# Patient Record
Sex: MALE | Race: OTHER
[De-identification: names, ages, dates, MRNs, and addresses within clinical notes are randomized per-mention and may not be internally consistent; named-entity substitution may affect disease eponyms.]

---

## 2017-03-14 ENCOUNTER — HOSPITAL ENCOUNTER (INPATIENT)
Dept: HOSPITAL 54 - ER | Age: 77
LOS: 6 days | Discharge: NURSING, REHAB OR LONG-TERM CARE | DRG: 885 | End: 2017-03-20
Attending: INTERNAL MEDICINE | Admitting: PSYCHIATRY & NEUROLOGY
Payer: MEDICARE

## 2017-03-14 VITALS — HEIGHT: 67 IN | WEIGHT: 192 LBS | BODY MASS INDEX: 30.13 KG/M2

## 2017-03-14 VITALS — SYSTOLIC BLOOD PRESSURE: 150 MMHG | DIASTOLIC BLOOD PRESSURE: 91 MMHG

## 2017-03-14 DIAGNOSIS — Z79.84: ICD-10-CM

## 2017-03-14 DIAGNOSIS — E11.22: ICD-10-CM

## 2017-03-14 DIAGNOSIS — N18.9: ICD-10-CM

## 2017-03-14 DIAGNOSIS — G40.909: ICD-10-CM

## 2017-03-14 DIAGNOSIS — F31.9: ICD-10-CM

## 2017-03-14 DIAGNOSIS — N17.9: ICD-10-CM

## 2017-03-14 DIAGNOSIS — F02.81: ICD-10-CM

## 2017-03-14 DIAGNOSIS — E78.5: ICD-10-CM

## 2017-03-14 DIAGNOSIS — E11.65: ICD-10-CM

## 2017-03-14 DIAGNOSIS — N40.0: ICD-10-CM

## 2017-03-14 DIAGNOSIS — F39: Primary | ICD-10-CM

## 2017-03-14 DIAGNOSIS — D63.8: ICD-10-CM

## 2017-03-14 DIAGNOSIS — I10: ICD-10-CM

## 2017-03-14 DIAGNOSIS — G30.9: ICD-10-CM

## 2017-03-14 DIAGNOSIS — I12.9: ICD-10-CM

## 2017-03-14 LAB
ALBUMIN SERPL BCP-MCNC: 3.4 G/DL (ref 3.4–5)
ALP SERPL-CCNC: 71 U/L (ref 46–116)
ALT SERPL W P-5'-P-CCNC: 17 U/L (ref 12–78)
APAP SERPL-MCNC: < 2 UG/ML (ref 10–30)
AST SERPL W P-5'-P-CCNC: 14 U/L (ref 15–37)
BACTERIA UR CULT: NO
BASOPHILS # BLD AUTO: 0 /CMM (ref 0–0.2)
BASOPHILS NFR BLD AUTO: 0.8 % (ref 0–2)
BILIRUB DIRECT SERPL-MCNC: 0.1 MG/DL (ref 0–0.2)
BILIRUB SERPL-MCNC: 0.3 MG/DL (ref 0.2–1)
BUN SERPL-MCNC: 30 MG/DL (ref 7–18)
CALCIUM SERPL-MCNC: 8.4 MG/DL (ref 8.5–10.1)
CANNABINOIDS UR QL SCN: NEGATIVE
CHLORIDE SERPL-SCNC: 108 MMOL/L (ref 98–107)
CO2 SERPL-SCNC: 28 MMOL/L (ref 21–32)
CREAT SERPL-MCNC: 1.2 MG/DL (ref 0.6–1.3)
EOSINOPHIL # BLD AUTO: 0 /CMM (ref 0–0.7)
EOSINOPHIL NFR BLD AUTO: 0.8 % (ref 0–6)
ETHANOL SERPL-MCNC: < 3 MG/DL (ref 0–0)
GLUCOSE SERPL-MCNC: 184 MG/DL (ref 74–106)
HCT VFR BLD AUTO: 33 % (ref 39–51)
HGB BLD-MCNC: 11.5 G/DL (ref 13.5–17.5)
LYMPHOCYTES NFR BLD AUTO: 1.2 /CMM (ref 0.8–4.8)
LYMPHOCYTES NFR BLD AUTO: 21.7 % (ref 20–44)
MCH RBC QN AUTO: 32 PG (ref 26–33)
MCHC RBC AUTO-ENTMCNC: 35 G/DL (ref 31–36)
MCV RBC AUTO: 91 FL (ref 80–96)
MONOCYTES NFR BLD AUTO: 0.3 /CMM (ref 0.1–1.3)
MONOCYTES NFR BLD AUTO: 5.3 % (ref 2–12)
NEUTROPHILS # BLD AUTO: 4 /CMM (ref 1.8–8.9)
NEUTROPHILS NFR BLD AUTO: 71.4 % (ref 43–81)
PCP UR QL SCN: NEGATIVE
PH UR STRIP: 5.5 [PH] (ref 5–8)
PLATELET # BLD AUTO: 109 /CMM (ref 150–450)
POTASSIUM SERPL-SCNC: 4.7 MMOL/L (ref 3.5–5.1)
PROT SERPL-MCNC: 6.5 G/DL (ref 6.4–8.2)
PROT UR QL STRIP: 30 MG/DL
RBC # BLD AUTO: 3.65 MIL/UL (ref 4.5–6)
RBC #/AREA URNS HPF: (no result) /HPF (ref 0–2)
RDW COEFFICIENT OF VARIATION: 12.9 (ref 11.5–15)
SALICYLATES SERPL-MCNC: < 2.8 MG/DL (ref 2.8–20)
SODIUM SERPL-SCNC: 143 MMOL/L (ref 136–145)
SP GR UR STRIP: 1.02 (ref 1–1.03)
UROBILINOGEN UR STRIP-MCNC: 1 EU/DL
WBC #/AREA URNS HPF: (no result) /HPF (ref 0–3)
WBC NRBC COR # BLD AUTO: 5.5 K/UL (ref 4.3–11)

## 2017-03-14 PROCEDURE — Z7610: HCPCS

## 2017-03-14 PROCEDURE — G0480 DRUG TEST DEF 1-7 CLASSES: HCPCS

## 2017-03-14 PROCEDURE — A4606 OXYGEN PROBE USED W OXIMETER: HCPCS

## 2017-03-14 NOTE — NUR
GPS ADMISSION NOTE, RECEIVED PATIENT FROM [facility]. PATIENT ARRIVED ON THIS UNIT 
AT 2300 VIA WHEELCHAIR WITH 1 CNA ESCORT.  PATIENT ADMITTED ON A 5150 HOLD FOR GD.  PER HOLD 
PATIENT IS CONFUSED, DISORGANIZED, DISORIENTATED, AND IS A POOR HISTORIAN.  ACCORDING TO 
STAFF AT [facility] PATIENT HAS BEEN EXHIBITING SEXUALLY INAPPROPRIATE BEHAVIOR 
TOWARD FEMALE STAFF AND RESIDENTS AT THE FACILITY.  PATIENT HAS A BEEN CHASING FEMALE 
RESIDENTS, TRYING TO HUG THEM, WHILE TRY TO TOUCH THEIR BREASTS.  WHEN PATIENT GETS 
REDIRECTED HE BECOMES AGGRESSIVE.  PATIENT IS UNABLE TO CARE FOR HIMSELF.  THE 5150 WAS 
REVIEWED AND THE DOCUMENTATION IN THE 5150 HOLD APPEARS TO REFLECT THE PRESENTATION OF THE 
PATIENT.  UPON FACE TO FACE ASSESSMENT PATIENT IS CURRENTLY LYING IN BED AWAKE, HAS NO S/S 
OR COMPLAINTS OF PAIN.  PATIENT IS DISPLAYING NO S/S OF APPARENT DISTRESS.  PATIENT 
BREATHING IS UNLABORED WITH EQUAL RISE AND FALL OF THE CHEST.  PATIENT IS ALERT AND 
ORIENTATED X 2 ON ROOM AIR AND IS Tongan SPEAKING ONLY.  PATIENT ASSISTED WITH TURING AND 
REPOSITIONING Q2HR AND PRN FOR COMFORT AND CIRCULATION.  PATIENT HAS NO NEEDS AT THIS TIME.  
PATIENT IS NOTED TO BEING ANXIOUS, DISHEVELED, DISORGANIZED, CONFUSED, COOPERATIVE, AND 
NEEDS REDIRECTION.  PATIENT DENIES SUICIDE IDEATIONS AND HOMICIDAL IDEATIONS AT THIS TIME.  
PATIENT IS UNDER THE PSYCHIATRIC CARE OF DR. GARCIA AND THE MEDICAL CARE OF DR COX.  PATIENT BELONGINGS WERE INVENTORIED AND CHECKED FOR CONTRABAND.  ALL 
CONTRABAND REMOVED AND STORED IN PATIENT HALLWAY LOCKER.  PATIENT ADVANCED DIRECTIVES 
PREFERENCE, IMMUNIZATIONS QUESTIONER, NECESSARY PAPERWORK, AND SKIN ASSESSMENT COMPLETED.  
PATIENT ORIENTATED TO ROOM, FLOOR, AND STAFF WITH ALL QUESTIONS ANSWERED.  PATIENT EDUCATED 
ON THE USE OF THE CALL BELL.  PATIENT BED SIDE RAILS ARE UP X 2 FOR SAFETY.  PATIENT BED IS 
LOCKED, LOW AND I WILL CONTINUE TO MONITOR THIS PATIENT Q 15 MIN WITH THE HELP OF STAFF TO 
MAINTAIN SAFETY.

## 2017-03-14 NOTE — NUR
PT BIB PA WITH A C/O INAPPROPRIATE SEXUAL BEHAVIOR TOWARDS STAFF/NURSES. PT IS 
East Timorese SPEAKING ONLY. URINE SAMPLE OBTAINED AND SENT TO LAB. PT IS IN ROOM #7. 
PT IS ON THE MONITOR AND CONTINUOUS PULSE OX.

## 2017-03-14 NOTE — NUR
IV STARTED IN LEFT HAND. PT REC'D MEDICATION AS ORDERED. EITAN LÓPEZ, ARRIVED 
AND IS REVIEWING PT'S CHART.

## 2017-03-15 VITALS — DIASTOLIC BLOOD PRESSURE: 80 MMHG | SYSTOLIC BLOOD PRESSURE: 150 MMHG

## 2017-03-15 VITALS — SYSTOLIC BLOOD PRESSURE: 135 MMHG | DIASTOLIC BLOOD PRESSURE: 77 MMHG

## 2017-03-15 VITALS — DIASTOLIC BLOOD PRESSURE: 77 MMHG | SYSTOLIC BLOOD PRESSURE: 135 MMHG

## 2017-03-15 LAB
CHOLEST SERPL-MCNC: 109 MG/DL (ref ?–200)
CREAT SERPL-MCNC: 0.9 MG/DL (ref 0.6–1.3)
HDLC SERPL-MCNC: 50 MG/DL (ref 40–60)
LDLC SERPL DIRECT ASSAY-MCNC: 44 MG/DL (ref 0–99)
TRIGL SERPL-MCNC: 84 MG/DL (ref 30–150)

## 2017-03-15 RX ADMIN — DIVALPROEX SODIUM SCH MG: 250 TABLET, DELAYED RELEASE ORAL at 18:12

## 2017-03-15 RX ADMIN — QUETIAPINE SCH MG: 25 TABLET, FILM COATED ORAL at 17:00

## 2017-03-15 RX ADMIN — DONEPEZIL HYDROCHLORIDE SCH MG: 5 TABLET ORAL at 21:20

## 2017-03-15 RX ADMIN — INSULIN HUMAN PRN UNIT: 100 INJECTION, SOLUTION PARENTERAL at 21:54

## 2017-03-15 RX ADMIN — Medication SCH EACH: at 21:46

## 2017-03-15 RX ADMIN — DIVALPROEX SODIUM SCH MG: 250 TABLET, DELAYED RELEASE ORAL at 14:01

## 2017-03-15 RX ADMIN — DIVALPROEX SODIUM SCH MG: 250 TABLET, DELAYED RELEASE ORAL at 10:00

## 2017-03-15 RX ADMIN — FINASTERIDE SCH MG: 5 TABLET, FILM COATED ORAL at 10:01

## 2017-03-15 RX ADMIN — METFORMIN HYDROCHLORIDE SCH MG: 500 TABLET, FILM COATED ORAL at 10:00

## 2017-03-15 RX ADMIN — ATORVASTATIN CALCIUM SCH MG: 40 TABLET, FILM COATED ORAL at 21:21

## 2017-03-15 RX ADMIN — Medication SCH EACH: at 07:30

## 2017-03-15 RX ADMIN — DOXAZOSIN MESYLATE SCH MG: 1 TABLET ORAL at 21:21

## 2017-03-15 RX ADMIN — ASPIRIN 81 MG SCH MG: 81 TABLET ORAL at 10:02

## 2017-03-15 RX ADMIN — Medication SCH EACH: at 12:00

## 2017-03-15 RX ADMIN — LORAZEPAM PRN MG: 0.5 TABLET ORAL at 12:57

## 2017-03-15 RX ADMIN — Medication SCH EACH: at 18:26

## 2017-03-15 RX ADMIN — METFORMIN HYDROCHLORIDE SCH MG: 500 TABLET, FILM COATED ORAL at 17:00

## 2017-03-15 RX ADMIN — Medication SCH TAB: at 10:00

## 2017-03-15 RX ADMIN — INSULIN HUMAN PRN UNIT: 100 INJECTION, SOLUTION PARENTERAL at 09:23

## 2017-03-15 RX ADMIN — LORAZEPAM PRN MG: 0.5 TABLET ORAL at 23:46

## 2017-03-15 RX ADMIN — INSULIN HUMAN PRN UNIT: 100 INJECTION, SOLUTION PARENTERAL at 18:34

## 2017-03-15 RX ADMIN — TEMAZEPAM PRN MG: 7.5 CAPSULE ORAL at 21:44

## 2017-03-15 NOTE — NUR
GPS RN NOTE, PATIENT NEEDS MEDICATION RECONCILIATION AND INSULIN SLIDING SCALE.  PAGED DR BROOKE JAVIER AND INFORMED HIM OF MY FINDINGS.  DR COX ORDERED A MILD INSULIN 
SLIDING SCALE AND TO CONTINUE ALL HOME MEDICATIONS.  ALL ORDERS NOTED AND CARRIED OUT.  WILL 
CONTINUE TO MONITOR THIS PATIENT.

## 2017-03-15 NOTE — NUR
Initial Discharge Plan: Patient resides at [address] [address]. [phone]. SW spoke with patient's wife [phone] who stated that 
she would like the patient to return to [facility].  confirmed with 
Felton [phone] from the facility who confirmed that patient can return upon discharge. 
ROSITA will follow-up with MD, family, and patient regarding most appropriate discharge. SW will 
help form a safe and proper discharge.



For smoking cessation, patient will be referred to the [employer] 

[facility] [phone] or [employer] 800-Lung-USA

## 2017-03-15 NOTE — NUR
PATIENT PREFERS DOOR CLOSED, EXPLAINED TO PATIENT NEED TO SEE INSIDE THE ROOM, PATIENT 
BECOMES AGITATED. PATIENT IN BED, WILL CLOSELY MONITOR FOR SAFETY.

## 2017-03-15 NOTE — NUR
PATIENT IN ACTIVITY ROOM, CALM, NO DISTRESS, DENIES ANY PAIN, WITH WIFE AND ANOTHER FAMILY 
MEMBER. Lithuanian SPEAKING ONLY. PATIENT HAS EPISODES OF RESTLESSNESS, CONFUSED, WANDERING AND 
RISK FOR ELOPEMENT. ABLE TO AMBULATE WITHOUT ASSISTANCE, STEADY GAIT. BEING MONITORED 
CLOSELY. MADE COMFORTABLE, WILL CONTINUE TO MONITOR.

## 2017-03-16 VITALS — SYSTOLIC BLOOD PRESSURE: 107 MMHG | DIASTOLIC BLOOD PRESSURE: 60 MMHG

## 2017-03-16 VITALS — DIASTOLIC BLOOD PRESSURE: 81 MMHG | SYSTOLIC BLOOD PRESSURE: 150 MMHG

## 2017-03-16 VITALS — DIASTOLIC BLOOD PRESSURE: 62 MMHG | SYSTOLIC BLOOD PRESSURE: 100 MMHG

## 2017-03-16 LAB
BUN SERPL-MCNC: 20 MG/DL (ref 7–18)
CALCIUM SERPL-MCNC: 8.6 MG/DL (ref 8.5–10.1)
CHLORIDE SERPL-SCNC: 106 MMOL/L (ref 98–107)
CO2 SERPL-SCNC: 28 MMOL/L (ref 21–32)
CREAT SERPL-MCNC: 0.9 MG/DL (ref 0.6–1.3)
GLUCOSE SERPL-MCNC: 172 MG/DL (ref 74–106)
MAGNESIUM SERPL-MCNC: 1.4 MG/DL (ref 1.8–2.4)
PHOSPHATE SERPL-MCNC: 2.8 MG/DL (ref 2.5–4.9)
POTASSIUM SERPL-SCNC: 4 MMOL/L (ref 3.5–5.1)
SODIUM SERPL-SCNC: 142 MMOL/L (ref 136–145)

## 2017-03-16 RX ADMIN — Medication SCH EACH: at 11:55

## 2017-03-16 RX ADMIN — MAGNESIUM OXIDE TAB 400 MG (241.3 MG ELEMENTAL MG) SCH MG: 400 (241.3 MG) TAB at 11:55

## 2017-03-16 RX ADMIN — DIVALPROEX SODIUM SCH MG: 250 TABLET, DELAYED RELEASE ORAL at 17:13

## 2017-03-16 RX ADMIN — QUETIAPINE SCH MG: 25 TABLET, FILM COATED ORAL at 08:14

## 2017-03-16 RX ADMIN — Medication SCH EACH: at 06:26

## 2017-03-16 RX ADMIN — ATORVASTATIN CALCIUM SCH MG: 40 TABLET, FILM COATED ORAL at 21:49

## 2017-03-16 RX ADMIN — Medication SCH EACH: at 17:15

## 2017-03-16 RX ADMIN — INSULIN HUMAN PRN UNIT: 100 INJECTION, SOLUTION PARENTERAL at 06:34

## 2017-03-16 RX ADMIN — DIVALPROEX SODIUM SCH MG: 250 TABLET, DELAYED RELEASE ORAL at 12:10

## 2017-03-16 RX ADMIN — BENAZEPRIL HYDROCHLORIDE SCH MG: 5 TABLET, COATED ORAL at 08:14

## 2017-03-16 RX ADMIN — TEMAZEPAM PRN MG: 7.5 CAPSULE ORAL at 21:49

## 2017-03-16 RX ADMIN — QUETIAPINE SCH MG: 25 TABLET, FILM COATED ORAL at 17:13

## 2017-03-16 RX ADMIN — METFORMIN HYDROCHLORIDE SCH MG: 500 TABLET, FILM COATED ORAL at 08:14

## 2017-03-16 RX ADMIN — DIVALPROEX SODIUM SCH MG: 250 TABLET, DELAYED RELEASE ORAL at 08:15

## 2017-03-16 RX ADMIN — Medication SCH TAB: at 08:18

## 2017-03-16 RX ADMIN — DONEPEZIL HYDROCHLORIDE SCH MG: 5 TABLET ORAL at 21:49

## 2017-03-16 RX ADMIN — FINASTERIDE SCH MG: 5 TABLET, FILM COATED ORAL at 08:18

## 2017-03-16 RX ADMIN — Medication SCH EACH: at 21:48

## 2017-03-16 RX ADMIN — METFORMIN HYDROCHLORIDE SCH MG: 500 TABLET, FILM COATED ORAL at 17:13

## 2017-03-16 RX ADMIN — DOXAZOSIN MESYLATE SCH MG: 1 TABLET ORAL at 21:49

## 2017-03-16 RX ADMIN — QUETIAPINE SCH MG: 25 TABLET, FILM COATED ORAL at 12:10

## 2017-03-16 RX ADMIN — ASPIRIN 81 MG SCH MG: 81 TABLET ORAL at 08:18

## 2017-03-16 RX ADMIN — Medication SCH EACH: at 17:18

## 2017-03-16 NOTE — NUR
PATIENT IN BED, AWAKE AND ALERT, CALM, NO INAPPROPRIATE BEHAVIOR TOWARDS STAFF, COMPLIANT 
WITH MEDICATION AND ACCUCHECK. SLEPT INTERMITTENTLY FOR 6 HOURS. , GIVEN INSULIN 2 
UNITS. FALL RISK, PATIENT WANDERING AND AN AWOL RISK. SITTER WILL BE PROVIDED DURING AM 
SHIFT.

## 2017-03-17 VITALS — DIASTOLIC BLOOD PRESSURE: 76 MMHG | SYSTOLIC BLOOD PRESSURE: 125 MMHG

## 2017-03-17 VITALS — DIASTOLIC BLOOD PRESSURE: 61 MMHG | SYSTOLIC BLOOD PRESSURE: 111 MMHG

## 2017-03-17 VITALS — DIASTOLIC BLOOD PRESSURE: 77 MMHG | SYSTOLIC BLOOD PRESSURE: 128 MMHG

## 2017-03-17 RX ADMIN — FINASTERIDE SCH MG: 5 TABLET, FILM COATED ORAL at 08:23

## 2017-03-17 RX ADMIN — DIVALPROEX SODIUM SCH MG: 250 TABLET, DELAYED RELEASE ORAL at 17:05

## 2017-03-17 RX ADMIN — QUETIAPINE SCH MG: 25 TABLET, FILM COATED ORAL at 21:46

## 2017-03-17 RX ADMIN — DIVALPROEX SODIUM SCH MG: 250 TABLET, DELAYED RELEASE ORAL at 08:23

## 2017-03-17 RX ADMIN — METFORMIN HYDROCHLORIDE SCH MG: 500 TABLET, FILM COATED ORAL at 17:05

## 2017-03-17 RX ADMIN — LORAZEPAM PRN MG: 0.5 TABLET ORAL at 23:07

## 2017-03-17 RX ADMIN — ASPIRIN 81 MG SCH MG: 81 TABLET ORAL at 08:23

## 2017-03-17 RX ADMIN — DIVALPROEX SODIUM SCH MG: 250 TABLET, DELAYED RELEASE ORAL at 12:53

## 2017-03-17 RX ADMIN — INSULIN HUMAN PRN UNIT: 100 INJECTION, SOLUTION PARENTERAL at 12:16

## 2017-03-17 RX ADMIN — INSULIN HUMAN PRN UNIT: 100 INJECTION, SOLUTION PARENTERAL at 17:13

## 2017-03-17 RX ADMIN — Medication SCH EACH: at 17:11

## 2017-03-17 RX ADMIN — QUETIAPINE SCH MG: 25 TABLET, FILM COATED ORAL at 08:24

## 2017-03-17 RX ADMIN — DIVALPROEX SODIUM SCH MG: 250 TABLET, DELAYED RELEASE ORAL at 21:45

## 2017-03-17 RX ADMIN — Medication SCH EACH: at 07:47

## 2017-03-17 RX ADMIN — DOXAZOSIN MESYLATE SCH MG: 1 TABLET ORAL at 21:45

## 2017-03-17 RX ADMIN — Medication SCH EACH: at 21:54

## 2017-03-17 RX ADMIN — DONEPEZIL HYDROCHLORIDE SCH MG: 5 TABLET ORAL at 23:03

## 2017-03-17 RX ADMIN — QUETIAPINE SCH MG: 25 TABLET, FILM COATED ORAL at 17:05

## 2017-03-17 RX ADMIN — Medication SCH TAB: at 08:23

## 2017-03-17 RX ADMIN — MAGNESIUM OXIDE TAB 400 MG (241.3 MG ELEMENTAL MG) SCH MG: 400 (241.3 MG) TAB at 08:23

## 2017-03-17 RX ADMIN — BENAZEPRIL HYDROCHLORIDE SCH MG: 5 TABLET, COATED ORAL at 08:23

## 2017-03-17 RX ADMIN — Medication SCH EACH: at 12:15

## 2017-03-17 RX ADMIN — INSULIN HUMAN PRN UNIT: 100 INJECTION, SOLUTION PARENTERAL at 21:56

## 2017-03-17 RX ADMIN — ATORVASTATIN CALCIUM SCH MG: 40 TABLET, FILM COATED ORAL at 23:04

## 2017-03-17 RX ADMIN — METFORMIN HYDROCHLORIDE SCH MG: 500 TABLET, FILM COATED ORAL at 08:24

## 2017-03-17 RX ADMIN — QUETIAPINE SCH MG: 25 TABLET, FILM COATED ORAL at 12:53

## 2017-03-18 VITALS — SYSTOLIC BLOOD PRESSURE: 124 MMHG | DIASTOLIC BLOOD PRESSURE: 81 MMHG

## 2017-03-18 VITALS — DIASTOLIC BLOOD PRESSURE: 78 MMHG | SYSTOLIC BLOOD PRESSURE: 128 MMHG

## 2017-03-18 VITALS — DIASTOLIC BLOOD PRESSURE: 95 MMHG | SYSTOLIC BLOOD PRESSURE: 154 MMHG

## 2017-03-18 RX ADMIN — QUETIAPINE SCH MG: 25 TABLET, FILM COATED ORAL at 12:10

## 2017-03-18 RX ADMIN — DIVALPROEX SODIUM SCH MG: 250 TABLET, DELAYED RELEASE ORAL at 21:22

## 2017-03-18 RX ADMIN — METFORMIN HYDROCHLORIDE SCH MG: 500 TABLET, FILM COATED ORAL at 08:31

## 2017-03-18 RX ADMIN — MAGNESIUM OXIDE TAB 400 MG (241.3 MG ELEMENTAL MG) SCH MG: 400 (241.3 MG) TAB at 08:31

## 2017-03-18 RX ADMIN — METFORMIN HYDROCHLORIDE SCH MG: 500 TABLET, FILM COATED ORAL at 16:48

## 2017-03-18 RX ADMIN — LORAZEPAM PRN MG: 0.5 TABLET ORAL at 11:46

## 2017-03-18 RX ADMIN — Medication SCH EACH: at 22:25

## 2017-03-18 RX ADMIN — INSULIN HUMAN PRN UNIT: 100 INJECTION, SOLUTION PARENTERAL at 11:57

## 2017-03-18 RX ADMIN — ASPIRIN 81 MG SCH MG: 81 TABLET ORAL at 08:31

## 2017-03-18 RX ADMIN — QUETIAPINE SCH MG: 25 TABLET, FILM COATED ORAL at 16:48

## 2017-03-18 RX ADMIN — Medication SCH EACH: at 07:45

## 2017-03-18 RX ADMIN — BENAZEPRIL HYDROCHLORIDE SCH MG: 5 TABLET, COATED ORAL at 08:31

## 2017-03-18 RX ADMIN — ATORVASTATIN CALCIUM SCH MG: 40 TABLET, FILM COATED ORAL at 22:26

## 2017-03-18 RX ADMIN — Medication SCH EACH: at 16:52

## 2017-03-18 RX ADMIN — DOXAZOSIN MESYLATE SCH MG: 1 TABLET ORAL at 22:26

## 2017-03-18 RX ADMIN — DIVALPROEX SODIUM SCH MG: 250 TABLET, DELAYED RELEASE ORAL at 08:34

## 2017-03-18 RX ADMIN — DONEPEZIL HYDROCHLORIDE SCH MG: 5 TABLET ORAL at 22:25

## 2017-03-18 RX ADMIN — DIVALPROEX SODIUM SCH MG: 250 TABLET, DELAYED RELEASE ORAL at 12:10

## 2017-03-18 RX ADMIN — DIVALPROEX SODIUM SCH MG: 250 TABLET, DELAYED RELEASE ORAL at 16:48

## 2017-03-18 RX ADMIN — Medication SCH TAB: at 08:31

## 2017-03-18 RX ADMIN — Medication SCH EACH: at 12:10

## 2017-03-18 RX ADMIN — TEMAZEPAM PRN MG: 7.5 CAPSULE ORAL at 22:26

## 2017-03-18 RX ADMIN — QUETIAPINE SCH MG: 25 TABLET, FILM COATED ORAL at 21:22

## 2017-03-18 RX ADMIN — FINASTERIDE SCH MG: 5 TABLET, FILM COATED ORAL at 08:31

## 2017-03-18 RX ADMIN — QUETIAPINE SCH MG: 25 TABLET, FILM COATED ORAL at 08:34

## 2017-03-18 NOTE — NUR
GPS RN: PATIENT IS RESTLESS, EASILY AGITATED, UNCOOPERATIVE, HARD TO REDIRECT. ADMINISTERED 
ATIVAN 1MG PO AS ORDERED. CONTINUE 1:1 MONITORING.

## 2017-03-19 VITALS — SYSTOLIC BLOOD PRESSURE: 107 MMHG | DIASTOLIC BLOOD PRESSURE: 57 MMHG

## 2017-03-19 VITALS — DIASTOLIC BLOOD PRESSURE: 72 MMHG | SYSTOLIC BLOOD PRESSURE: 129 MMHG

## 2017-03-19 VITALS — DIASTOLIC BLOOD PRESSURE: 80 MMHG | SYSTOLIC BLOOD PRESSURE: 130 MMHG

## 2017-03-19 RX ADMIN — LORAZEPAM PRN MG: 0.5 TABLET ORAL at 10:53

## 2017-03-19 RX ADMIN — FINASTERIDE SCH MG: 5 TABLET, FILM COATED ORAL at 08:29

## 2017-03-19 RX ADMIN — LORAZEPAM PRN MG: 0.5 TABLET ORAL at 00:55

## 2017-03-19 RX ADMIN — DOXAZOSIN MESYLATE SCH MG: 1 TABLET ORAL at 21:34

## 2017-03-19 RX ADMIN — Medication SCH TAB: at 08:29

## 2017-03-19 RX ADMIN — METFORMIN HYDROCHLORIDE SCH MG: 500 TABLET, FILM COATED ORAL at 08:29

## 2017-03-19 RX ADMIN — DONEPEZIL HYDROCHLORIDE SCH MG: 5 TABLET ORAL at 21:33

## 2017-03-19 RX ADMIN — METFORMIN HYDROCHLORIDE SCH MG: 500 TABLET, FILM COATED ORAL at 17:39

## 2017-03-19 RX ADMIN — QUETIAPINE SCH MG: 25 TABLET, FILM COATED ORAL at 17:39

## 2017-03-19 RX ADMIN — DIVALPROEX SODIUM SCH MG: 250 TABLET, DELAYED RELEASE ORAL at 13:19

## 2017-03-19 RX ADMIN — Medication SCH EACH: at 17:30

## 2017-03-19 RX ADMIN — QUETIAPINE SCH MG: 25 TABLET, FILM COATED ORAL at 21:33

## 2017-03-19 RX ADMIN — BENAZEPRIL HYDROCHLORIDE SCH MG: 5 TABLET, COATED ORAL at 08:29

## 2017-03-19 RX ADMIN — Medication SCH EACH: at 12:00

## 2017-03-19 RX ADMIN — QUETIAPINE SCH MG: 25 TABLET, FILM COATED ORAL at 08:31

## 2017-03-19 RX ADMIN — ASPIRIN 81 MG SCH MG: 81 TABLET ORAL at 08:29

## 2017-03-19 RX ADMIN — MAGNESIUM OXIDE TAB 400 MG (241.3 MG ELEMENTAL MG) SCH MG: 400 (241.3 MG) TAB at 08:29

## 2017-03-19 RX ADMIN — DIVALPROEX SODIUM SCH MG: 250 TABLET, DELAYED RELEASE ORAL at 21:33

## 2017-03-19 RX ADMIN — QUETIAPINE SCH MG: 25 TABLET, FILM COATED ORAL at 13:19

## 2017-03-19 RX ADMIN — INSULIN HUMAN PRN UNIT: 100 INJECTION, SOLUTION PARENTERAL at 12:02

## 2017-03-19 RX ADMIN — DIVALPROEX SODIUM SCH MG: 250 TABLET, DELAYED RELEASE ORAL at 08:32

## 2017-03-19 RX ADMIN — LORAZEPAM PRN MG: 0.5 TABLET ORAL at 17:39

## 2017-03-19 RX ADMIN — DIVALPROEX SODIUM SCH MG: 250 TABLET, DELAYED RELEASE ORAL at 17:39

## 2017-03-19 RX ADMIN — ATORVASTATIN CALCIUM SCH MG: 40 TABLET, FILM COATED ORAL at 21:33

## 2017-03-19 RX ADMIN — Medication SCH EACH: at 21:34

## 2017-03-19 RX ADMIN — Medication SCH EACH: at 08:15

## 2017-03-19 NOTE — NUR
GPS RN:

 PATIENT IS RESTLESS, EASILY AGITATED,  HARD TO REDIRECT. ADMINISTERED ATIVAN 1MG PO AS 
ORDERED. CONTINUE 1:1 MONITORING.

## 2017-03-20 VITALS — SYSTOLIC BLOOD PRESSURE: 104 MMHG | DIASTOLIC BLOOD PRESSURE: 83 MMHG

## 2017-03-20 VITALS — DIASTOLIC BLOOD PRESSURE: 83 MMHG | SYSTOLIC BLOOD PRESSURE: 104 MMHG

## 2017-03-20 RX ADMIN — METFORMIN HYDROCHLORIDE SCH MG: 500 TABLET, FILM COATED ORAL at 08:30

## 2017-03-20 RX ADMIN — INSULIN HUMAN PRN UNIT: 100 INJECTION, SOLUTION PARENTERAL at 12:42

## 2017-03-20 RX ADMIN — LORAZEPAM PRN MG: 0.5 TABLET ORAL at 11:26

## 2017-03-20 RX ADMIN — Medication SCH EACH: at 08:30

## 2017-03-20 RX ADMIN — QUETIAPINE SCH MG: 25 TABLET, FILM COATED ORAL at 12:40

## 2017-03-20 RX ADMIN — QUETIAPINE SCH MG: 25 TABLET, FILM COATED ORAL at 08:35

## 2017-03-20 RX ADMIN — BENAZEPRIL HYDROCHLORIDE SCH MG: 5 TABLET, COATED ORAL at 08:35

## 2017-03-20 RX ADMIN — Medication SCH TAB: at 08:30

## 2017-03-20 RX ADMIN — INSULIN HUMAN PRN UNIT: 100 INJECTION, SOLUTION PARENTERAL at 08:43

## 2017-03-20 RX ADMIN — ASPIRIN 81 MG SCH MG: 81 TABLET ORAL at 08:30

## 2017-03-20 RX ADMIN — Medication SCH EACH: at 11:26

## 2017-03-20 RX ADMIN — DIVALPROEX SODIUM SCH MG: 250 TABLET, DELAYED RELEASE ORAL at 12:40

## 2017-03-20 RX ADMIN — FINASTERIDE SCH MG: 5 TABLET, FILM COATED ORAL at 08:30

## 2017-03-20 RX ADMIN — DIVALPROEX SODIUM SCH MG: 250 TABLET, DELAYED RELEASE ORAL at 08:35

## 2017-03-20 NOTE — NUR
RN NOTES-DISCHARGED



PATIENT HAS BEEN CLEARED FOR DISCHARGE TO SNF BY MD. PATIENT IS AMBULATORY, V/S REMAINS 
STABLE. NO C/O PAIN OR ANY DISCOMFORT. APPEARS CALM AND RELAX. A/O X1-2. SKIN INTACT. 
BELONGINGS CHECKED AND SEND WITH THE PATIENT UPON DISCHARGED. CALLED [facility] 
FACILITY, SPOKE TO SANCHEZ LEGER FOR REPORT. PATIENT LEFT HOSP IN STABLE CONDITION VIA 
AMBULANCE.

## 2017-03-20 NOTE — NUR
Psychosocial assessment was reviewed and I concur with the information provided. No changes 
are necessary. 



Robert Guillen[place] LMFT [id number]

-------------------------------------------------------------------------------

Addendum: 03/20/17 at 1017 by ROBERT GUILLEN SW

-------------------------------------------------------------------------------

Amended: Links added.

## 2017-03-20 NOTE — NUR
Discharge Note: Patient was discharged back to [place] [id number] [facility] [place] [address]. [phone]. Via med response. Patients wife [phone] was 
notified. Facilitated info to IDT team who are in agreement with discharge arrangement. The 
multidisciplinary exitcare form was done, printed, signed, and given to the patient.

## 2017-10-23 ENCOUNTER — HOSPITAL ENCOUNTER (INPATIENT)
Dept: HOSPITAL 12 - ER | Age: 77
LOS: 3 days | Discharge: NURSING, REHAB OR LONG-TERM CARE | DRG: 641 | End: 2017-10-26
Payer: MEDICARE

## 2017-10-23 VITALS — SYSTOLIC BLOOD PRESSURE: 150 MMHG | DIASTOLIC BLOOD PRESSURE: 85 MMHG

## 2017-10-23 VITALS — SYSTOLIC BLOOD PRESSURE: 173 MMHG | DIASTOLIC BLOOD PRESSURE: 71 MMHG

## 2017-10-23 VITALS — SYSTOLIC BLOOD PRESSURE: 157 MMHG | DIASTOLIC BLOOD PRESSURE: 88 MMHG

## 2017-10-23 VITALS — HEIGHT: 68 IN | BODY MASS INDEX: 28.79 KG/M2 | WEIGHT: 190 LBS

## 2017-10-23 VITALS — SYSTOLIC BLOOD PRESSURE: 173 MMHG | DIASTOLIC BLOOD PRESSURE: 91 MMHG

## 2017-10-23 DIAGNOSIS — I10: ICD-10-CM
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
## 2017-10-23 DIAGNOSIS — T46.4X5A: ICD-10-CM
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
## 2017-10-23 DIAGNOSIS — Z86.73: ICD-10-CM

## 2017-10-23 DIAGNOSIS — I95.2: ICD-10-CM

## 2017-10-23 DIAGNOSIS — E78.5: ICD-10-CM

## 2017-10-23 DIAGNOSIS — E11.9: ICD-10-CM

## 2017-10-23 DIAGNOSIS — Z91.81: ICD-10-CM
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
## 2017-10-23 DIAGNOSIS — I71.2: ICD-10-CM

## 2017-10-23 DIAGNOSIS — Z87.891: ICD-10-CM

## 2017-10-23 DIAGNOSIS — E86.9: Primary | ICD-10-CM

## 2017-10-23 DIAGNOSIS — F99: ICD-10-CM

## 2017-10-23 DIAGNOSIS — F20.9: ICD-10-CM

## 2017-10-23 DIAGNOSIS — F03.90: ICD-10-CM

## 2017-10-23 DIAGNOSIS — G40.909: ICD-10-CM

## 2017-10-23 DIAGNOSIS — R55: ICD-10-CM

## 2017-10-23 LAB
ALP SERPL-CCNC: 59 U/L (ref 50–136)
ALT SERPL W/O P-5'-P-CCNC: 25 U/L (ref 16–63)
APPEARANCE UR: CLEAR
AST SERPL-CCNC: 20 U/L (ref 15–37)
BASOPHILS # BLD AUTO: 0 K/UL (ref 0–8)
BASOPHILS NFR BLD AUTO: 0.6 % (ref 0–2)
BILIRUB DIRECT SERPL-MCNC: 0.1 MG/DL (ref 0–0.2)
BILIRUB SERPL-MCNC: 0.2 MG/DL (ref 0.2–1)
BILIRUB UR QL STRIP: NEGATIVE
BUN SERPL-MCNC: 23 MG/DL (ref 7–18)
CHLORIDE SERPL-SCNC: 108 MMOL/L (ref 98–107)
CO2 SERPL-SCNC: 27 MMOL/L (ref 21–32)
COLOR UR: YELLOW
CREAT SERPL-MCNC: 0.9 MG/DL (ref 0.6–1.3)
DEPRECATED SQUAMOUS URNS QL MICRO: (no result) /HPF
EOSINOPHIL # BLD AUTO: 0 K/UL (ref 0–0.7)
EOSINOPHIL NFR BLD AUTO: 1 % (ref 0–7)
EOSINOPHIL NFR BLD MANUAL: 1 % (ref 0–8)
GLUCOSE SERPL-MCNC: 117 MG/DL (ref 74–106)
GLUCOSE UR STRIP-MCNC: NEGATIVE MG/DL
HCT VFR BLD AUTO: 36.5 % (ref 40–50)
HGB BLD-MCNC: 11.8 G/DL (ref 14–18)
HGB UR QL STRIP: NEGATIVE
KETONES UR STRIP-MCNC: NEGATIVE MG/DL
LEUKOCYTE ESTERASE UR QL STRIP: NEGATIVE
LYMPHOCYTES # BLD AUTO: 1.6 K/UL (ref 0.8–4.8)
LYMPHOCYTES NFR BLD AUTO: 31.7 % (ref 20.5–51.5)
LYMPHOCYTES NFR BLD MANUAL: 32 % (ref 20–40)
MCH RBC QN AUTO: 30.2 UUG (ref 27–31)
MCHC RBC AUTO-ENTMCNC: 32 G/DL (ref 32–37)
MCV RBC AUTO: 93.1 FL (ref 82–92)
MONOCYTES # BLD AUTO: 0.3 K/UL (ref 0.1–1.3)
MONOCYTES NFR BLD AUTO: 6.1 % (ref 0–11)
MONOCYTES NFR BLD MANUAL: 6 % (ref 2–10)
NEUTROPHILS # BLD AUTO: 3 K/UL (ref 1.8–8.9)
NEUTROPHILS NFR BLD AUTO: 60.6 % (ref 38.5–71.5)
NEUTS BAND NFR BLD MANUAL: 3 % (ref 0–10)
NEUTS SEG NFR BLD MANUAL: 58 % (ref 42–75)
NITRITE UR QL STRIP: NEGATIVE
PH UR STRIP: 6 [PH] (ref 5–8)
PLATELET # BLD AUTO: 93 K/UL (ref 150–450)
POTASSIUM SERPL-SCNC: 4.6 MMOL/L (ref 3.5–5.1)
PROT UR QL STRIP: NEGATIVE
RBC # BLD AUTO: 3.92 MIL/UL (ref 4.7–6.1)
RBC #/AREA URNS HPF: (no result) /HPF (ref 0–3)
SP GR UR STRIP: 1.01 (ref 1–1.03)
UROBILINOGEN UR STRIP-MCNC: 0.2 E.U./DL
WBC # BLD AUTO: 4.9 K/UL (ref 4–11.2)
WBC #/AREA URNS HPF: (no result) /HPF
WBC #/AREA URNS HPF: (no result) /HPF (ref 0–3)
WS STN SPEC: 6.9 G/DL (ref 6.4–8.2)

## 2017-10-23 PROCEDURE — A4663 DIALYSIS BLOOD PRESSURE CUFF: HCPCS

## 2017-10-23 RX ADMIN — SODIUM CHLORIDE PRN UNIT: 9 INJECTION, SOLUTION INTRAVENOUS at 20:40

## 2017-10-23 RX ADMIN — Medication SCH EACH: at 20:35

## 2017-10-23 RX ADMIN — ATORVASTATIN CALCIUM SCH MG: 40 TABLET, FILM COATED ORAL at 20:20

## 2017-10-23 RX ADMIN — METFORMIN HYDROCHLORIDE SCH MG: 500 TABLET ORAL at 18:08

## 2017-10-23 RX ADMIN — DONEPEZIL HYDROCHLORIDE SCH MG: 10 TABLET, FILM COATED ORAL at 20:20

## 2017-10-23 RX ADMIN — QUETIAPINE SCH MG: 25 TABLET, FILM COATED ORAL at 20:29

## 2017-10-23 RX ADMIN — DOXAZOSIN MESYLATE SCH MG: 2 TABLET ORAL at 20:21

## 2017-10-23 RX ADMIN — DIVALPROEX SODIUM SCH MG: 125 CAPSULE ORAL at 20:20

## 2017-10-23 NOTE — NUR
RECEIVED PATIENT IN BED, AWAKE, VERBALLY RESPONSIVE, Omani SPEAKING, FAMILY ABLE TO 
INTERPRET FOR THE PATIENT, NO SOB NO CHEST PAIN, RHYTHM SINUS RHYTHM , NO COMPLAIN OF PAIN 
AT THIS TIME. CONT 1; 1 SITTER FOR SAFETY, CONT TO MONITOR.

## 2017-10-23 NOTE — NUR
NOTE WENT TO CHECK ON PATIENT IV PULLED WAS OUT. PRESSURE DRESSING PLACED NO 
BLEEDING NOTED. PATIENT IN BED NOT IN ANY DISTRESS.

## 2017-10-23 NOTE — NUR
CALLED [facility] SPOKE WITH THE SUPERVISOR AND SHE CONFIRMED THAT PATIENT 
RECEIVED THE FLU SHOT 8/2016 BUT THE PNEUMONIA SOT HE RECEIVED 8/26/15.UNABLE TO VERIFY DATE 
OF PREVIOUS BOWEL MOVEMENT STATED THAT PATIENT IS INDEPENDENT.

## 2017-10-23 NOTE — NUR
RECEIVED PATIENT BY [facility]MEN TO ROOM 206 WITH DX OF FALL PLACED INTO BED FIXED AND MADE 
COMFORTABLE PATIENT IS ALERT TO SELF BUT IS CONFUSED ABLE TO OBEY SIMPLE COMMANDS.ORIENTED 
TO ROOM AND FACILITY PROTOCOL VIA AN .MADE COMFORTABLE

## 2017-10-23 NOTE — NUR
PATIENT BP WAS HIGH EARLIER, BUT NO ASYMPTOMATIC, NO COMPLAIN OF HEADACHE, NO DIZZINESS, NO 
NAUSEA, RECHECK BP, BP WENT DOWN AT THIS TIME, CONT TO MONITOR.

## 2017-10-23 NOTE — NUR
pt deneis any cp or sob at this time. no sign of distress. translation from 
English provided by sheron hernandez.

## 2017-10-24 VITALS — DIASTOLIC BLOOD PRESSURE: 80 MMHG | SYSTOLIC BLOOD PRESSURE: 135 MMHG

## 2017-10-24 VITALS — DIASTOLIC BLOOD PRESSURE: 79 MMHG | SYSTOLIC BLOOD PRESSURE: 132 MMHG

## 2017-10-24 VITALS — DIASTOLIC BLOOD PRESSURE: 74 MMHG | SYSTOLIC BLOOD PRESSURE: 125 MMHG

## 2017-10-24 VITALS — SYSTOLIC BLOOD PRESSURE: 123 MMHG | DIASTOLIC BLOOD PRESSURE: 85 MMHG

## 2017-10-24 VITALS — DIASTOLIC BLOOD PRESSURE: 91 MMHG | SYSTOLIC BLOOD PRESSURE: 150 MMHG

## 2017-10-24 VITALS — DIASTOLIC BLOOD PRESSURE: 74 MMHG | SYSTOLIC BLOOD PRESSURE: 126 MMHG

## 2017-10-24 VITALS — DIASTOLIC BLOOD PRESSURE: 73 MMHG | SYSTOLIC BLOOD PRESSURE: 113 MMHG

## 2017-10-24 LAB
ALP SERPL-CCNC: 54 U/L (ref 50–136)
ALT SERPL W/O P-5'-P-CCNC: 23 U/L (ref 16–63)
AST SERPL-CCNC: 17 U/L (ref 15–37)
BASOPHILS # BLD AUTO: 0 K/UL (ref 0–8)
BASOPHILS NFR BLD AUTO: 0.5 % (ref 0–2)
BILIRUB SERPL-MCNC: 0.4 MG/DL (ref 0.2–1)
BUN SERPL-MCNC: 22 MG/DL (ref 7–18)
CHLORIDE SERPL-SCNC: 108 MMOL/L (ref 98–107)
CHOLEST SERPL-MCNC: 127 MG/DL (ref ?–200)
CO2 SERPL-SCNC: 28 MMOL/L (ref 21–32)
CREAT SERPL-MCNC: 0.9 MG/DL (ref 0.6–1.3)
EOSINOPHIL # BLD AUTO: 0 K/UL (ref 0–0.7)
EOSINOPHIL NFR BLD AUTO: 0.9 % (ref 0–7)
GLUCOSE SERPL-MCNC: 127 MG/DL (ref 74–106)
HCT VFR BLD AUTO: 34.6 % (ref 36.7–47.1)
HDLC SERPL-MCNC: 54 MG/DL (ref 40–60)
HGB BLD-MCNC: 11.9 G/DL (ref 12.5–16.3)
LYMPHOCYTES # BLD AUTO: 1.8 K/UL (ref 20–40)
LYMPHOCYTES NFR BLD AUTO: 35 % (ref 20.5–51.5)
MCH RBC QN AUTO: 31.3 UUG (ref 23.8–33.4)
MCHC RBC AUTO-ENTMCNC: 34 G/DL (ref 32.5–36.3)
MCV RBC AUTO: 91.3 FL (ref 73–96.2)
MONOCYTES # BLD AUTO: 0.4 K/UL (ref 2–10)
MONOCYTES NFR BLD AUTO: 7.5 % (ref 0–11)
NEUTROPHILS # BLD AUTO: 2.9 K/UL (ref 1.8–8.9)
NEUTROPHILS NFR BLD AUTO: 56.1 % (ref 38.5–71.5)
PLATELET # BLD AUTO: 97 K/UL (ref 152–348)
POTASSIUM SERPL-SCNC: 4.1 MMOL/L (ref 3.5–5.1)
RBC # BLD AUTO: 3.79 MIL/UL (ref 4.06–5.63)
TRIGL SERPL-MCNC: 54 MG/DL (ref 30–150)
TSH SERPL DL<=0.005 MIU/L-ACNC: 4.45 MIU/ML (ref 0.36–3.74)
WBC # BLD AUTO: 5.1 K/UL (ref 3.6–10.2)
WS STN SPEC: 6.4 G/DL (ref 6.4–8.2)

## 2017-10-24 RX ADMIN — Medication SCH MG: at 08:15

## 2017-10-24 RX ADMIN — DOXAZOSIN MESYLATE SCH MG: 2 TABLET ORAL at 20:05

## 2017-10-24 RX ADMIN — METFORMIN HYDROCHLORIDE SCH MG: 500 TABLET ORAL at 17:07

## 2017-10-24 RX ADMIN — Medication SCH EACH: at 11:38

## 2017-10-24 RX ADMIN — SODIUM CHLORIDE PRN UNIT: 9 INJECTION, SOLUTION INTRAVENOUS at 20:21

## 2017-10-24 RX ADMIN — ATORVASTATIN CALCIUM SCH MG: 40 TABLET, FILM COATED ORAL at 20:04

## 2017-10-24 RX ADMIN — DIVALPROEX SODIUM SCH MG: 125 CAPSULE ORAL at 16:38

## 2017-10-24 RX ADMIN — SODIUM CHLORIDE PRN UNIT: 9 INJECTION, SOLUTION INTRAVENOUS at 11:41

## 2017-10-24 RX ADMIN — DIVALPROEX SODIUM SCH MG: 125 CAPSULE ORAL at 20:03

## 2017-10-24 RX ADMIN — QUETIAPINE SCH MG: 25 TABLET, FILM COATED ORAL at 08:16

## 2017-10-24 RX ADMIN — Medication SCH EACH: at 16:16

## 2017-10-24 RX ADMIN — DIVALPROEX SODIUM SCH MG: 125 CAPSULE ORAL at 12:17

## 2017-10-24 RX ADMIN — Medication SCH TAB: at 08:16

## 2017-10-24 RX ADMIN — QUETIAPINE SCH MG: 25 TABLET, FILM COATED ORAL at 20:04

## 2017-10-24 RX ADMIN — METFORMIN HYDROCHLORIDE SCH MG: 500 TABLET ORAL at 08:16

## 2017-10-24 RX ADMIN — DIVALPROEX SODIUM SCH MG: 125 CAPSULE ORAL at 08:16

## 2017-10-24 RX ADMIN — Medication SCH EACH: at 20:06

## 2017-10-24 RX ADMIN — LORAZEPAM PRN MG: 1 TABLET ORAL at 21:58

## 2017-10-24 RX ADMIN — QUETIAPINE SCH MG: 25 TABLET, FILM COATED ORAL at 12:17

## 2017-10-24 RX ADMIN — DONEPEZIL HYDROCHLORIDE SCH MG: 10 TABLET, FILM COATED ORAL at 20:04

## 2017-10-24 RX ADMIN — LORAZEPAM PRN MG: 1 TABLET ORAL at 14:45

## 2017-10-24 RX ADMIN — FINASTERIDE SCH MG: 5 TABLET, FILM COATED ORAL at 08:15

## 2017-10-24 RX ADMIN — Medication PRN MG: at 23:01

## 2017-10-24 RX ADMIN — ASPIRIN SCH MG: 81 TABLET, CHEWABLE ORAL at 08:15

## 2017-10-24 RX ADMIN — SODIUM CHLORIDE PRN UNIT: 9 INJECTION, SOLUTION INTRAVENOUS at 17:09

## 2017-10-24 RX ADMIN — QUETIAPINE SCH MG: 25 TABLET, FILM COATED ORAL at 16:16

## 2017-10-24 RX ADMIN — Medication SCH EACH: at 05:49

## 2017-10-24 RX ADMIN — SODIUM CHLORIDE PRN MLS/HR: 0.45 INJECTION, SOLUTION INTRAVENOUS at 11:20

## 2017-10-24 NOTE — NUR
PATIENT IS AGITATED AND RESTLESS AND PULLING ON THE IV TUBBING GETTING AGGRESSIVE WITH THE 
SITTER UNABLE TO REDIRECT AND HIS ATIVAN IS ORDERED FOR Q12H AND NOT DUE TO BE GIVEN CALLED 
DR DOMINGUEZ AND NOTIFIED HIM WITH NEW ORDER TO CHANGE FREQUENCY TO Q6H AND NOTED.

## 2017-10-24 NOTE — NUR
RECEIVED PATIENT IN BED ASLLEP BUT OPENS EYES WHEN NAME IS CALLED BUT WILL PROMPTLY FALL 
ASLEEP S/P ATIVAN GIVEN AT 5AM THIS MORNING FOR AGITATION/RESTLESSNESS.REMAIN ON ONE ON ONE 
SITTER FOR SAFETY MADE COMFORTABLE AND WILL CONTINUE TO OBSERVE.

## 2017-10-24 NOTE — NUR
SPOKE WITH PATIENTS WIFE WHO IS AT THE BEDSIDE RE FLU SHOT AND SHE STATED THAT PATIENT 
RECEIVED FLU SHOT FIRST WEEK OF OCTOBER AND DOCUMENTED.

## 2017-10-24 NOTE — NUR
PATIENT SLEPT MOST OF THE NIGHT, NO SOB NO CHEST PAIN, RHYTHM IS SINUS RHYTHM, WITH EPISODES 
OF ANXIETY, MEDICATED FOR ANXIETY WITH EFFECTIVE RESULTS, NO S/S OF HYPO/HYPERGLYCEMIA 
NOTED, CONT 1;1 SITTER FOR SAFETY, CALL LIGHT WITHIN REACH.

## 2017-10-24 NOTE — NUR
PT AGITATED, PULLING IV, UNABLE TO REDIRECT. ADMINISTERED ATIVAN AS ORDERED. 1:1 SITTER 
PROVIDED FOR SAFETY. WILL CONTINUE TO MONITOR.

## 2017-10-25 VITALS — DIASTOLIC BLOOD PRESSURE: 79 MMHG | SYSTOLIC BLOOD PRESSURE: 125 MMHG

## 2017-10-25 VITALS — DIASTOLIC BLOOD PRESSURE: 75 MMHG | SYSTOLIC BLOOD PRESSURE: 114 MMHG

## 2017-10-25 VITALS — DIASTOLIC BLOOD PRESSURE: 71 MMHG | SYSTOLIC BLOOD PRESSURE: 130 MMHG

## 2017-10-25 RX ADMIN — Medication SCH EACH: at 12:17

## 2017-10-25 RX ADMIN — LORAZEPAM PRN MG: 1 TABLET ORAL at 20:23

## 2017-10-25 RX ADMIN — SODIUM CHLORIDE PRN UNIT: 9 INJECTION, SOLUTION INTRAVENOUS at 21:45

## 2017-10-25 RX ADMIN — DOXAZOSIN MESYLATE SCH MG: 2 TABLET ORAL at 20:32

## 2017-10-25 RX ADMIN — METFORMIN HYDROCHLORIDE SCH MG: 500 TABLET ORAL at 08:20

## 2017-10-25 RX ADMIN — Medication SCH TAB: at 08:20

## 2017-10-25 RX ADMIN — DIVALPROEX SODIUM SCH MG: 125 CAPSULE ORAL at 08:20

## 2017-10-25 RX ADMIN — DIVALPROEX SODIUM SCH MG: 125 CAPSULE ORAL at 13:33

## 2017-10-25 RX ADMIN — SODIUM CHLORIDE PRN UNIT: 9 INJECTION, SOLUTION INTRAVENOUS at 13:30

## 2017-10-25 RX ADMIN — SODIUM CHLORIDE PRN MLS/HR: 0.45 INJECTION, SOLUTION INTRAVENOUS at 11:04

## 2017-10-25 RX ADMIN — DONEPEZIL HYDROCHLORIDE SCH MG: 10 TABLET, FILM COATED ORAL at 20:23

## 2017-10-25 RX ADMIN — Medication SCH MG: at 08:25

## 2017-10-25 RX ADMIN — QUETIAPINE SCH MG: 25 TABLET, FILM COATED ORAL at 08:21

## 2017-10-25 RX ADMIN — ATORVASTATIN CALCIUM SCH MG: 40 TABLET, FILM COATED ORAL at 20:23

## 2017-10-25 RX ADMIN — LORAZEPAM PRN MG: 1 TABLET ORAL at 08:56

## 2017-10-25 RX ADMIN — QUETIAPINE SCH MG: 25 TABLET, FILM COATED ORAL at 16:55

## 2017-10-25 RX ADMIN — QUETIAPINE SCH MG: 25 TABLET, FILM COATED ORAL at 13:32

## 2017-10-25 RX ADMIN — DIVALPROEX SODIUM SCH MG: 125 CAPSULE ORAL at 16:55

## 2017-10-25 RX ADMIN — Medication SCH EACH: at 20:33

## 2017-10-25 RX ADMIN — METFORMIN HYDROCHLORIDE SCH MG: 500 TABLET ORAL at 17:00

## 2017-10-25 RX ADMIN — FINASTERIDE SCH MG: 5 TABLET, FILM COATED ORAL at 08:20

## 2017-10-25 RX ADMIN — ASPIRIN SCH MG: 81 TABLET, CHEWABLE ORAL at 08:21

## 2017-10-25 RX ADMIN — DIVALPROEX SODIUM SCH MG: 125 CAPSULE ORAL at 20:23

## 2017-10-25 RX ADMIN — QUETIAPINE SCH MG: 25 TABLET, FILM COATED ORAL at 20:22

## 2017-10-25 RX ADMIN — Medication SCH EACH: at 16:54

## 2017-10-25 RX ADMIN — Medication SCH EACH: at 06:37

## 2017-10-25 RX ADMIN — Medication PRN MG: at 22:31

## 2017-10-25 NOTE — NUR
PT SLEPT WELL, IN NO ACUTE DISTRESS. PT IS ON TELE SINUS RHYTHM. ACCUCHECKS AS ORDERED, NO 
S/S HYPO/HYPERGLYCEMIA NOTED. 1:1 SITTER PROVIDED FOR SAFETY, WILL CONTINUE TO MONITOR.


-------------------------------------------------------------------------------

Addendum: 10/25/17 at 0646 by MEGHAN ROBLERO RN

-------------------------------------------------------------------------------

ADD: PT IS CALM, NO S/S OF AGITATION.

## 2017-10-25 NOTE — NUR
Patient is attempting to leave the facility and is confused, mentioned that he needs to get 
on the bus and go home, work is over for the day.  Patient is being combative when attempted 
to help him back to bed.  Scratching and hitting staff. contacted Dr. Arana for orders as 
patient is refusing oral ativan.

## 2017-10-25 NOTE — NUR
PT IS AGITATED, FREQUENTLY GOES OUT OF THE ROOM, UNCOOPERATIVE, NOT REDIRECTABLE. 
ADMINISTERED ATIVAN. 1:1 SITTER PROVIDED FOR SAFETY. WILL CONTINUE TO MONITOR.

## 2017-10-25 NOTE — NUR
Received report from night shift nurse, patient in bed awake, no evidence of distress noted 
at this time. bed in low position, side rails up x2.

## 2017-10-25 NOTE — NUR
Patients anxiety and agitation is increasing, preparation to administer ativan to patient to 
help relive agitation.

## 2017-10-26 VITALS — DIASTOLIC BLOOD PRESSURE: 77 MMHG | SYSTOLIC BLOOD PRESSURE: 107 MMHG

## 2017-10-26 VITALS — DIASTOLIC BLOOD PRESSURE: 65 MMHG | SYSTOLIC BLOOD PRESSURE: 123 MMHG

## 2017-10-26 RX ADMIN — QUETIAPINE SCH MG: 25 TABLET, FILM COATED ORAL at 12:25

## 2017-10-26 RX ADMIN — SODIUM CHLORIDE PRN MLS/HR: 0.45 INJECTION, SOLUTION INTRAVENOUS at 03:06

## 2017-10-26 RX ADMIN — VALPROIC ACID SCH MG: 250 SOLUTION ORAL at 09:43

## 2017-10-26 RX ADMIN — VALPROIC ACID SCH MG: 250 SOLUTION ORAL at 16:35

## 2017-10-26 RX ADMIN — Medication SCH TAB: at 08:48

## 2017-10-26 RX ADMIN — LORAZEPAM PRN MG: 1 TABLET ORAL at 08:47

## 2017-10-26 RX ADMIN — Medication SCH EACH: at 16:30

## 2017-10-26 RX ADMIN — Medication SCH EACH: at 12:15

## 2017-10-26 RX ADMIN — VALPROIC ACID SCH MG: 250 SOLUTION ORAL at 12:26

## 2017-10-26 RX ADMIN — QUETIAPINE SCH MG: 25 TABLET, FILM COATED ORAL at 08:48

## 2017-10-26 RX ADMIN — METFORMIN HYDROCHLORIDE SCH MG: 500 TABLET ORAL at 08:48

## 2017-10-26 RX ADMIN — LORAZEPAM PRN MG: 1 TABLET ORAL at 14:48

## 2017-10-26 RX ADMIN — QUETIAPINE SCH MG: 25 TABLET, FILM COATED ORAL at 16:35

## 2017-10-26 RX ADMIN — FINASTERIDE SCH MG: 5 TABLET, FILM COATED ORAL at 08:48

## 2017-10-26 RX ADMIN — ASPIRIN SCH MG: 81 TABLET, CHEWABLE ORAL at 08:47

## 2017-10-26 RX ADMIN — SODIUM CHLORIDE PRN UNIT: 9 INJECTION, SOLUTION INTRAVENOUS at 12:29

## 2017-10-26 RX ADMIN — Medication SCH EACH: at 06:41

## 2017-10-26 RX ADMIN — Medication SCH MG: at 08:47

## 2017-10-26 NOTE — NUR
SLEPT WELL, IN NO ACUTE DISTRESS. PT IS ON TELE - SR WITH OCCASIONAL PVCS AND 3 SECS OF 
ATRIAL TACH. 1:1 SITTER PROVIDED FOR SAFETY, WILL CONTINUE TO MONITOR.

## 2017-10-26 NOTE — NUR
PATIENTS BEHAVIOR HAS ESCALATED AND HE IS ATTEMPTING TO LEAVE THE FACILITY AND TRYING TO 
TAKE OUT HIS IV LINES. PREPARATION TO GIVE ATIVAN

## 2017-10-26 NOTE — NUR
PATIENT HAS BEEN PICKED UP BY TRANSPORT COMPANY, IV LINE REMOVED, NO EVIDENCE OF DISTRESS 
NOTED AT THIS TIME.  PATIENT HAS HAD HIS MEAL, AND TOOK HIS 5PM MEDICATIONS.  ALL EDUCATION 
MATERIAL SENT IN A PACKAGE TO PATIENTS FAMILY WITH THE TRANSPORT AGENCY TO BE DELIVERED TO 
THE STAFF AT FACILITY.

## 2017-10-26 NOTE — NUR
RECEIVED REPORT FROM NIGHT SHIFT NURSE, PATIENT IN BED ASLEEP, NO EVIDENCE OF DISTRESS NOTED 
AT THIS TIME. BED IN LOW POSITION, SIDE RAILS UP X2, SITTER AT BEDSIDE.

## 2017-10-26 NOTE — NUR
PATIENT HAS AGAIN STARTED HAVING SEVERE AGITATION AND ATTEMPTING TO LEAVE THE HOSPITAL TO GO 
HOME, HE HAS BEEN COMBATIVE AND ATTEMPTING TO STRIKE AT STAFF.

## 2018-06-18 ENCOUNTER — HOSPITAL ENCOUNTER (INPATIENT)
Dept: HOSPITAL 12 - ER | Age: 78
LOS: 10 days | DRG: 876 | End: 2018-06-28
Payer: MEDICARE

## 2018-06-18 VITALS — DIASTOLIC BLOOD PRESSURE: 75 MMHG | SYSTOLIC BLOOD PRESSURE: 134 MMHG

## 2018-06-18 VITALS — HEIGHT: 68 IN | BODY MASS INDEX: 31.22 KG/M2 | WEIGHT: 206 LBS

## 2018-06-18 DIAGNOSIS — N40.0: ICD-10-CM

## 2018-06-18 DIAGNOSIS — F39: Primary | ICD-10-CM

## 2018-06-18 DIAGNOSIS — E11.622: ICD-10-CM

## 2018-06-18 DIAGNOSIS — G47.00: ICD-10-CM

## 2018-06-18 DIAGNOSIS — M19.011: ICD-10-CM

## 2018-06-18 DIAGNOSIS — N17.9: ICD-10-CM

## 2018-06-18 DIAGNOSIS — L97.318: ICD-10-CM

## 2018-06-18 DIAGNOSIS — E11.9: ICD-10-CM

## 2018-06-18 DIAGNOSIS — Z79.899: ICD-10-CM

## 2018-06-18 DIAGNOSIS — D64.9: ICD-10-CM

## 2018-06-18 DIAGNOSIS — D72.820: ICD-10-CM

## 2018-06-18 DIAGNOSIS — Z86.59: ICD-10-CM

## 2018-06-18 DIAGNOSIS — Z79.84: ICD-10-CM
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
## 2018-06-18 DIAGNOSIS — G40.909: ICD-10-CM

## 2018-06-18 DIAGNOSIS — F41.9: ICD-10-CM

## 2018-06-18 DIAGNOSIS — Z79.82: ICD-10-CM

## 2018-06-18 DIAGNOSIS — G30.9: ICD-10-CM

## 2018-06-18 DIAGNOSIS — F29: ICD-10-CM

## 2018-06-18 DIAGNOSIS — F02.81: ICD-10-CM

## 2018-06-18 DIAGNOSIS — Z66: ICD-10-CM

## 2018-06-18 DIAGNOSIS — R16.0: ICD-10-CM

## 2018-06-18 DIAGNOSIS — D69.6: ICD-10-CM

## 2018-06-18 DIAGNOSIS — E11.65: ICD-10-CM
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
## 2018-06-18 DIAGNOSIS — E78.5: ICD-10-CM

## 2018-06-18 DIAGNOSIS — L03.115: ICD-10-CM

## 2018-06-18 DIAGNOSIS — K80.20: ICD-10-CM

## 2018-06-18 LAB
ALP SERPL-CCNC: 54 U/L (ref 50–136)
ALT SERPL W/O P-5'-P-CCNC: 21 U/L (ref 16–63)
AST SERPL-CCNC: 12 U/L (ref 15–37)
BASOPHILS # BLD AUTO: 0 K/UL (ref 0–8)
BASOPHILS NFR BLD AUTO: 0.6 % (ref 0–2)
BILIRUB DIRECT SERPL-MCNC: 0.1 MG/DL (ref 0–0.2)
BILIRUB SERPL-MCNC: 0.2 MG/DL (ref 0.2–1)
BUN SERPL-MCNC: 28 MG/DL (ref 7–18)
CHLORIDE SERPL-SCNC: 109 MMOL/L (ref 98–107)
CO2 SERPL-SCNC: 28 MMOL/L (ref 21–32)
CREAT SERPL-MCNC: 1.4 MG/DL (ref 0.6–1.3)
EOSINOPHIL # BLD AUTO: 0 K/UL (ref 0–0.7)
EOSINOPHIL NFR BLD AUTO: 0.6 % (ref 0–7)
ETHANOL SERPL-MCNC: < 3 MG/DL (ref 0–0)
GLUCOSE SERPL-MCNC: 153 MG/DL (ref 74–106)
HCT VFR BLD AUTO: 33.1 % (ref 36.7–47.1)
HGB BLD-MCNC: 11.4 G/DL (ref 12.5–16.3)
LYMPHOCYTES # BLD AUTO: 1.3 K/UL (ref 20–40)
LYMPHOCYTES NFR BLD AUTO: 30.5 % (ref 20.5–51.5)
MCH RBC QN AUTO: 31.7 UUG (ref 23.8–33.4)
MCHC RBC AUTO-ENTMCNC: 34 G/DL (ref 32.5–36.3)
MCV RBC AUTO: 92.1 FL (ref 73–96.2)
MONOCYTES # BLD AUTO: 0.3 K/UL (ref 2–10)
MONOCYTES NFR BLD AUTO: 6.3 % (ref 0–11)
NEUTROPHILS # BLD AUTO: 2.7 K/UL (ref 1.8–8.9)
NEUTROPHILS NFR BLD AUTO: 62 % (ref 38.5–71.5)
PLATELET # BLD AUTO: 85 K/UL (ref 152–348)
POTASSIUM SERPL-SCNC: 4.7 MMOL/L (ref 3.5–5.1)
RBC # BLD AUTO: 3.6 MIL/UL (ref 4.06–5.63)
TSH SERPL DL<=0.005 MIU/L-ACNC: 3.06 MIU/ML (ref 0.36–3.74)
WBC # BLD AUTO: 4.4 K/UL (ref 3.6–10.2)
WS STN SPEC: 6.6 G/DL (ref 6.4–8.2)

## 2018-06-18 PROCEDURE — G0480 DRUG TEST DEF 1-7 CLASSES: HCPCS

## 2018-06-18 PROCEDURE — A4663 DIALYSIS BLOOD PRESSURE CUFF: HCPCS

## 2018-06-18 NOTE — NUR
Admission Note:

77 y.o.  (Tajik speaking) male placed on 5150 for DTO and GD. GORAN Almodovar RN used 
as . Pt brought to [facility] from [facility] and medically 
cleared in ER. Pt brought to [facility] from ER via [facility] accompanied by ER staff. VS stable on 
admission. According to the hold, Pt has been striking out at staff as they attempt to 
redirect his aggressive and exit-seeking behavior. Pt last hospitalized at [facility] in October 2017 for a similar episode. Pt calms only when family is 
present. RN concurs with Hold. Pt given Patient Rights Handbook and oriented to the unit and 
his room. Unit rules and expectations explained.

A+Ox1 to self only. Pt disoriented, confused, disorganized and forgetful. Pt slow to respond 
and has blunted/restricted affect. Pt gives irrelevant and non-sensical answers to 
questions.  Pt became aggressive and agitated when asked to remove his necklace to put into 
the valuables locker for safety reasons. Security was called and had to intervene to remove 
the necklace. Pt eventually calmed. Dr Nuno and Dr Arana notified of admission, orders 
received, meds reconciled. VS stable, denies pain, no acute physical distress.

## 2018-06-19 VITALS — DIASTOLIC BLOOD PRESSURE: 67 MMHG | SYSTOLIC BLOOD PRESSURE: 106 MMHG

## 2018-06-19 VITALS — DIASTOLIC BLOOD PRESSURE: 91 MMHG | SYSTOLIC BLOOD PRESSURE: 158 MMHG

## 2018-06-19 VITALS — DIASTOLIC BLOOD PRESSURE: 86 MMHG | SYSTOLIC BLOOD PRESSURE: 157 MMHG

## 2018-06-19 LAB
CHOLEST SERPL-MCNC: 193 MG/DL (ref ?–200)
GLUCOSE SERPL-MCNC: 166 MG/DL (ref 70–115)
HDLC SERPL-MCNC: 48 MG/DL (ref 40–60)
TRIGL SERPL-MCNC: 133 MG/DL (ref 30–150)

## 2018-06-19 RX ADMIN — FINASTERIDE SCH MG: 5 TABLET, FILM COATED ORAL at 09:55

## 2018-06-19 RX ADMIN — LORAZEPAM PRN MG: 1 TABLET ORAL at 10:08

## 2018-06-19 RX ADMIN — ASPIRIN SCH MG: 81 TABLET, CHEWABLE ORAL at 12:56

## 2018-06-19 RX ADMIN — TEMAZEPAM PRN MG: 7.5 CAPSULE ORAL at 22:03

## 2018-06-19 RX ADMIN — Medication SCH TAB: at 09:55

## 2018-06-19 RX ADMIN — LORAZEPAM PRN MG: 1 TABLET ORAL at 16:54

## 2018-06-19 RX ADMIN — METFORMIN HYDROCHLORIDE SCH MG: 500 TABLET ORAL at 09:55

## 2018-06-19 RX ADMIN — ATORVASTATIN CALCIUM SCH MG: 40 TABLET, FILM COATED ORAL at 20:03

## 2018-06-19 RX ADMIN — DIVALPROEX SODIUM SCH MG: 250 TABLET, DELAYED RELEASE ORAL at 20:03

## 2018-06-19 RX ADMIN — METFORMIN HYDROCHLORIDE SCH MG: 500 TABLET ORAL at 18:00

## 2018-06-19 RX ADMIN — DOXAZOSIN MESYLATE SCH MG: 2 TABLET ORAL at 20:08

## 2018-06-19 RX ADMIN — QUETIAPINE SCH MG: 25 TABLET, FILM COATED ORAL at 18:23

## 2018-06-19 RX ADMIN — QUETIAPINE SCH MG: 25 TABLET, FILM COATED ORAL at 20:03

## 2018-06-19 NOTE — NUR
Firearms Report:   completed and submitted [employer] Firearms Report on 6/19/18 for 
5150 Danger to Others and Gravely Disabled certification

## 2018-06-19 NOTE — NUR
GPS: Nursing Notes: Thought Disorder:

Patient is awake and responding to his name, impaired judgment, slightly unsteady gait, on 
fall precautions, refusing to use a fww, AWOL risk, constantly trying to leave unit, setting 
off - fire alarm door by trying to leave unit, disoriented, A/Ox1, "I want to go home... My 
wife is waiting for me..", redirected and reoriented during shift, but continue to be 
forgetful, confused, believes that he needs to go home, unkempt appearance, labile, 
unpredictable behavior at times, unable to formulate a plan for self care, continue with 
treatment plan.

## 2018-06-20 VITALS — SYSTOLIC BLOOD PRESSURE: 147 MMHG | DIASTOLIC BLOOD PRESSURE: 81 MMHG

## 2018-06-20 VITALS — SYSTOLIC BLOOD PRESSURE: 139 MMHG | DIASTOLIC BLOOD PRESSURE: 69 MMHG

## 2018-06-20 VITALS — SYSTOLIC BLOOD PRESSURE: 147 MMHG | DIASTOLIC BLOOD PRESSURE: 70 MMHG

## 2018-06-20 VITALS — DIASTOLIC BLOOD PRESSURE: 90 MMHG | SYSTOLIC BLOOD PRESSURE: 162 MMHG

## 2018-06-20 LAB
ALP SERPL-CCNC: 59 U/L (ref 50–136)
ALT SERPL W/O P-5'-P-CCNC: 17 U/L (ref 16–63)
AST SERPL-CCNC: 12 U/L (ref 15–37)
BASOPHILS # BLD AUTO: 0 K/UL (ref 0–8)
BASOPHILS NFR BLD AUTO: 0.5 % (ref 0–2)
BILIRUB SERPL-MCNC: 0.5 MG/DL (ref 0.2–1)
BUN SERPL-MCNC: 22 MG/DL (ref 7–18)
CHLORIDE SERPL-SCNC: 107 MMOL/L (ref 98–107)
CK SERPL-CCNC: 77 U/L (ref 39–308)
CO2 SERPL-SCNC: 29 MMOL/L (ref 21–32)
CREAT SERPL-MCNC: 0.9 MG/DL (ref 0.6–1.3)
CREAT UR-MCNC: 58.6 MG/DL (ref 30–125)
EOSINOPHIL # BLD AUTO: 0.1 K/UL (ref 0–0.7)
EOSINOPHIL NFR BLD AUTO: 1.6 % (ref 0–7)
FERRITIN SERPL-MCNC: 63 NG/ML (ref 26–388)
GLUCOSE SERPL-MCNC: 138 MG/DL (ref 74–106)
HCT VFR BLD AUTO: 36.3 % (ref 36.7–47.1)
HGB BLD-MCNC: 12.4 G/DL (ref 12.5–16.3)
IRON SERPL-MCNC: 63 UG/DL (ref 50–175)
LDH SERPL L TO P-CCNC: 165 U/L (ref 85–227)
LYMPHOCYTES # BLD AUTO: 1.5 K/UL (ref 20–40)
LYMPHOCYTES NFR BLD AUTO: 29.5 % (ref 20.5–51.5)
MAGNESIUM SERPL-MCNC: 1.7 MG/DL (ref 1.8–2.4)
MCH RBC QN AUTO: 31.2 UUG (ref 23.8–33.4)
MCHC RBC AUTO-ENTMCNC: 34 G/DL (ref 32.5–36.3)
MCV RBC AUTO: 91.4 FL (ref 73–96.2)
MONOCYTES # BLD AUTO: 0.4 K/UL (ref 2–10)
MONOCYTES NFR BLD AUTO: 8.3 % (ref 0–11)
NEUTROPHILS # BLD AUTO: 3.1 K/UL (ref 1.8–8.9)
NEUTROPHILS NFR BLD AUTO: 60.1 % (ref 38.5–71.5)
PHOSPHATE SERPL-MCNC: 2.5 MG/DL (ref 2.5–4.9)
PLATELET # BLD AUTO: 93 K/UL (ref 152–348)
POTASSIUM SERPL-SCNC: 3.9 MMOL/L (ref 3.5–5.1)
RBC # BLD AUTO: 3.97 MIL/UL (ref 4.06–5.63)
TSH SERPL DL<=0.005 MIU/L-ACNC: 4.39 MIU/ML (ref 0.36–3.74)
URATE SERPL-MCNC: 7.2 MG/DL (ref 3.5–7.2)
WBC # BLD AUTO: 5.2 K/UL (ref 3.6–10.2)
WS STN SPEC: 6.9 G/DL (ref 6.4–8.2)

## 2018-06-20 RX ADMIN — Medication SCH TAB: at 08:38

## 2018-06-20 RX ADMIN — QUETIAPINE SCH MG: 25 TABLET, FILM COATED ORAL at 20:35

## 2018-06-20 RX ADMIN — DIVALPROEX SODIUM SCH MG: 250 TABLET, DELAYED RELEASE ORAL at 17:06

## 2018-06-20 RX ADMIN — DIVALPROEX SODIUM SCH MG: 250 TABLET, DELAYED RELEASE ORAL at 08:38

## 2018-06-20 RX ADMIN — ATORVASTATIN CALCIUM SCH MG: 40 TABLET, FILM COATED ORAL at 20:35

## 2018-06-20 RX ADMIN — QUETIAPINE SCH MG: 25 TABLET, FILM COATED ORAL at 12:01

## 2018-06-20 RX ADMIN — ASPIRIN SCH MG: 81 TABLET, CHEWABLE ORAL at 08:38

## 2018-06-20 RX ADMIN — QUETIAPINE SCH MG: 25 TABLET, FILM COATED ORAL at 17:06

## 2018-06-20 RX ADMIN — QUETIAPINE SCH MG: 25 TABLET, FILM COATED ORAL at 08:38

## 2018-06-20 RX ADMIN — DIVALPROEX SODIUM SCH MG: 250 TABLET, DELAYED RELEASE ORAL at 20:35

## 2018-06-20 RX ADMIN — ACETAMINOPHEN PRN MG: 325 TABLET ORAL at 12:12

## 2018-06-20 RX ADMIN — METFORMIN HYDROCHLORIDE SCH MG: 500 TABLET ORAL at 17:06

## 2018-06-20 RX ADMIN — LORAZEPAM PRN MG: 1 TABLET ORAL at 18:27

## 2018-06-20 RX ADMIN — DOXAZOSIN MESYLATE SCH MG: 2 TABLET ORAL at 20:35

## 2018-06-20 RX ADMIN — LORAZEPAM PRN MG: 1 TABLET ORAL at 02:36

## 2018-06-20 RX ADMIN — METFORMIN HYDROCHLORIDE SCH MG: 500 TABLET ORAL at 08:38

## 2018-06-20 RX ADMIN — FINASTERIDE SCH MG: 5 TABLET, FILM COATED ORAL at 08:38

## 2018-06-20 RX ADMIN — LORAZEPAM PRN MG: 1 TABLET ORAL at 09:48

## 2018-06-20 RX ADMIN — ANORECTAL OINTMENT SCH APPLIC: 15.7; .44; 24; 20.6 OINTMENT TOPICAL at 20:36

## 2018-06-20 RX ADMIN — DIVALPROEX SODIUM SCH MG: 250 TABLET, DELAYED RELEASE ORAL at 12:01

## 2018-06-20 NOTE — NUR
GPS:  Pt.is awake at this time. Anxious,confused and disoriented. Re-directed and re-assured 
frequently. Denies the need to use the toilet at this time. Ativan 1mg given PO.Will monitor 
effectiveness. Fall precautions observed. Quiet environment provided to facilitate sleep.

## 2018-06-21 VITALS — DIASTOLIC BLOOD PRESSURE: 72 MMHG | SYSTOLIC BLOOD PRESSURE: 124 MMHG

## 2018-06-21 VITALS — SYSTOLIC BLOOD PRESSURE: 141 MMHG | DIASTOLIC BLOOD PRESSURE: 71 MMHG

## 2018-06-21 VITALS — SYSTOLIC BLOOD PRESSURE: 136 MMHG | DIASTOLIC BLOOD PRESSURE: 73 MMHG

## 2018-06-21 LAB
ALBUMIN SERPL ELPH-MCNC: 3.6 G/DL (ref 2.9–4.4)
ALBUMIN/GLOB SERPL: 1.3 {RATIO} (ref 0.7–1.7)
ALPHA1 GLOB SERPL ELPH-MCNC: 0.2 G/DL (ref 0–0.4)
ALPHA2 GLOB SERPL ELPH-MCNC: 0.6 G/DL (ref 0.4–1)
B-GLOBULIN SERPL ELPH-MCNC: 0.9 G/DL (ref 0.7–1.3)
GAMMA GLOB SERPL ELPH-MCNC: 1 G/DL (ref 0.4–1.8)
GLOBULIN SER CALC-MCNC: 2.7 G/DL (ref 2.2–3.9)

## 2018-06-21 RX ADMIN — ANORECTAL OINTMENT PRN APPLIC: 15.7; .44; 24; 20.6 OINTMENT TOPICAL at 03:48

## 2018-06-21 RX ADMIN — QUETIAPINE SCH MG: 25 TABLET, FILM COATED ORAL at 08:42

## 2018-06-21 RX ADMIN — DIVALPROEX SODIUM SCH MG: 250 TABLET, DELAYED RELEASE ORAL at 08:41

## 2018-06-21 RX ADMIN — ATORVASTATIN CALCIUM SCH MG: 40 TABLET, FILM COATED ORAL at 20:27

## 2018-06-21 RX ADMIN — QUETIAPINE SCH MG: 25 TABLET, FILM COATED ORAL at 17:23

## 2018-06-21 RX ADMIN — DIVALPROEX SODIUM SCH MG: 250 TABLET, DELAYED RELEASE ORAL at 13:28

## 2018-06-21 RX ADMIN — ANORECTAL OINTMENT SCH APPLIC: 15.7; .44; 24; 20.6 OINTMENT TOPICAL at 20:30

## 2018-06-21 RX ADMIN — LORAZEPAM PRN MG: 1 TABLET ORAL at 03:42

## 2018-06-21 RX ADMIN — Medication SCH TAB: at 08:41

## 2018-06-21 RX ADMIN — DIVALPROEX SODIUM SCH MG: 125 CAPSULE ORAL at 20:28

## 2018-06-21 RX ADMIN — ANORECTAL OINTMENT SCH APPLIC: 15.7; .44; 24; 20.6 OINTMENT TOPICAL at 09:04

## 2018-06-21 RX ADMIN — ASPIRIN SCH MG: 81 TABLET, CHEWABLE ORAL at 08:41

## 2018-06-21 RX ADMIN — METFORMIN HYDROCHLORIDE SCH MG: 500 TABLET ORAL at 08:42

## 2018-06-21 RX ADMIN — QUETIAPINE SCH MG: 25 TABLET, FILM COATED ORAL at 12:31

## 2018-06-21 RX ADMIN — QUETIAPINE SCH MG: 25 TABLET, FILM COATED ORAL at 20:28

## 2018-06-21 RX ADMIN — DOXAZOSIN MESYLATE SCH MG: 2 TABLET ORAL at 20:28

## 2018-06-21 RX ADMIN — FINASTERIDE SCH MG: 5 TABLET, FILM COATED ORAL at 08:42

## 2018-06-21 RX ADMIN — METFORMIN HYDROCHLORIDE SCH MG: 500 TABLET ORAL at 17:29

## 2018-06-21 RX ADMIN — DIVALPROEX SODIUM SCH MG: 125 CAPSULE ORAL at 17:23

## 2018-06-21 NOTE — NUR
GPS:   Pt.is awake,anxious and restless at this time. Unable to be re-directed by staff. 
Pt.keeps trying to get up from bed. Denies the need to go to the toilet when asked. Diaper 
is dry at this time. Ativan 1mg given PO with very little water as possible(needs to be 
NPO). Will monitor effectiveness. Denied pain when asked. Sitter at bedside.

## 2018-06-22 VITALS — SYSTOLIC BLOOD PRESSURE: 115 MMHG | DIASTOLIC BLOOD PRESSURE: 68 MMHG

## 2018-06-22 VITALS — SYSTOLIC BLOOD PRESSURE: 130 MMHG | DIASTOLIC BLOOD PRESSURE: 58 MMHG

## 2018-06-22 VITALS — SYSTOLIC BLOOD PRESSURE: 102 MMHG | DIASTOLIC BLOOD PRESSURE: 54 MMHG

## 2018-06-22 RX ADMIN — QUETIAPINE SCH MG: 25 TABLET, FILM COATED ORAL at 16:07

## 2018-06-22 RX ADMIN — DIVALPROEX SODIUM SCH MG: 125 CAPSULE ORAL at 16:07

## 2018-06-22 RX ADMIN — DIVALPROEX SODIUM SCH MG: 125 CAPSULE ORAL at 23:58

## 2018-06-22 RX ADMIN — LORAZEPAM PRN MG: 1 TABLET ORAL at 14:15

## 2018-06-22 RX ADMIN — ASPIRIN SCH MG: 81 TABLET, CHEWABLE ORAL at 08:17

## 2018-06-22 RX ADMIN — DIVALPROEX SODIUM SCH MG: 125 CAPSULE ORAL at 12:05

## 2018-06-22 RX ADMIN — DOXAZOSIN MESYLATE SCH MG: 2 TABLET ORAL at 21:00

## 2018-06-22 RX ADMIN — ATORVASTATIN CALCIUM SCH MG: 40 TABLET, FILM COATED ORAL at 21:00

## 2018-06-22 RX ADMIN — QUETIAPINE SCH MG: 25 TABLET, FILM COATED ORAL at 23:58

## 2018-06-22 RX ADMIN — METFORMIN HYDROCHLORIDE SCH MG: 500 TABLET ORAL at 08:16

## 2018-06-22 RX ADMIN — QUETIAPINE SCH MG: 25 TABLET, FILM COATED ORAL at 08:17

## 2018-06-22 RX ADMIN — Medication SCH TAB: at 08:17

## 2018-06-22 RX ADMIN — ACETAMINOPHEN PRN MG: 325 TABLET ORAL at 14:15

## 2018-06-22 RX ADMIN — QUETIAPINE SCH MG: 25 TABLET, FILM COATED ORAL at 21:00

## 2018-06-22 RX ADMIN — DIVALPROEX SODIUM SCH MG: 125 CAPSULE ORAL at 21:00

## 2018-06-22 RX ADMIN — ANORECTAL OINTMENT SCH APPLIC: 15.7; .44; 24; 20.6 OINTMENT TOPICAL at 08:18

## 2018-06-22 RX ADMIN — FINASTERIDE SCH MG: 5 TABLET, FILM COATED ORAL at 08:16

## 2018-06-22 RX ADMIN — DIVALPROEX SODIUM SCH MG: 125 CAPSULE ORAL at 08:17

## 2018-06-22 RX ADMIN — ANORECTAL OINTMENT SCH APPLIC: 15.7; .44; 24; 20.6 OINTMENT TOPICAL at 22:26

## 2018-06-22 RX ADMIN — QUETIAPINE SCH MG: 25 TABLET, FILM COATED ORAL at 12:06

## 2018-06-22 RX ADMIN — METFORMIN HYDROCHLORIDE SCH MG: 500 TABLET ORAL at 17:07

## 2018-06-22 NOTE — NUR
PATIENT HAS INCREASED CONFUSION. TRYING TO GET OUT OF BED. BEING COMBATIVE AND THEN CLOSING 
HIS EYES A DOZING OFF TO SLEEP. PATIENT VERY WEAK- HIGH RISK OF FALL

## 2018-06-22 NOTE — NUR
GPS: REMAIN CALM AND COOPERATIVE WITH MEDICATION AND CARE. SHOWERED THIS MORNING. ASSISTED 
WITH ADL'S DUE TO PATIENT IS INCONTINENT. TURN Q 2 HRS FOR SKIN SAFETY. REMAIN ON 1:1 SITTER 
@ BEDSIDE FOR SAFETY. SLEPT 7:15 HRS THROUGH THE NIGHT. CONTINUE PLAN OF CARE.

## 2018-06-22 NOTE — NUR
PATIENT WAS BECOMING COMBATIVE. PATIENT WANTING TO GET OUT OF BED, FORGETS, DOES NOT FOLLOW, 
HIGH RISK FOR FALL. SAT PATIENT UP TO GERIATRIC CHAIR. TAKEN PATIENT TO DAY ROOM TO WATCH 
TV. PATIENT GIVEN PRN ATIVAN.

## 2018-06-22 NOTE — NUR
GPS:  Pt.still sound asleep in bed at this time since shift change. Unable to give bedtime 
meds.at this time. VSS. In no acute resp.distress noted. Sitter at bedside for safety. 
Repositioned Q2 hrs and prn.

## 2018-06-22 NOTE — NUR
PATIENT IN ROOM WITH SITTER. PATIENT HAS BEEN IRRITATED THIS MORNING. GIVEN MORNING 
MEDICATION CRUSHED. PATIENT COMPLIANT WITH MEDS. ASKED PATIENT IF HE WANTED TO WATCH TV IN 
THE DAY ROOM; HE SAID YES. PATIENT IS [place] SPEAKING ONLY ASKED THERAPIST IF ACOMODATION 
CAN BE POSSIBLE.

## 2018-06-23 VITALS — SYSTOLIC BLOOD PRESSURE: 121 MMHG | DIASTOLIC BLOOD PRESSURE: 57 MMHG

## 2018-06-23 VITALS — DIASTOLIC BLOOD PRESSURE: 63 MMHG | SYSTOLIC BLOOD PRESSURE: 124 MMHG

## 2018-06-23 VITALS — DIASTOLIC BLOOD PRESSURE: 65 MMHG | SYSTOLIC BLOOD PRESSURE: 125 MMHG

## 2018-06-23 LAB
BASOPHILS # BLD AUTO: 0 K/UL (ref 0–8)
BASOPHILS NFR BLD AUTO: 0.2 % (ref 0–2)
EOSINOPHIL # BLD AUTO: 0 K/UL (ref 0–0.7)
EOSINOPHIL NFR BLD AUTO: 0.6 % (ref 0–7)
HCT VFR BLD AUTO: 34.2 % (ref 36.7–47.1)
HGB BLD-MCNC: 11.8 G/DL (ref 12.5–16.3)
LYMPHOCYTES # BLD AUTO: 1.1 K/UL (ref 20–40)
LYMPHOCYTES NFR BLD AUTO: 13.8 % (ref 20.5–51.5)
MCH RBC QN AUTO: 31.6 UUG (ref 23.8–33.4)
MCHC RBC AUTO-ENTMCNC: 35 G/DL (ref 32.5–36.3)
MCV RBC AUTO: 91.5 FL (ref 73–96.2)
MONOCYTES # BLD AUTO: 0.7 K/UL (ref 2–10)
MONOCYTES NFR BLD AUTO: 8.5 % (ref 0–11)
NEUTROPHILS # BLD AUTO: 6.2 K/UL (ref 1.8–8.9)
NEUTROPHILS NFR BLD AUTO: 76.9 % (ref 38.5–71.5)
PLATELET # BLD AUTO: 103 K/UL (ref 152–348)
RBC # BLD AUTO: 3.73 MIL/UL (ref 4.06–5.63)
WBC # BLD AUTO: 8 K/UL (ref 3.6–10.2)

## 2018-06-23 RX ADMIN — LORAZEPAM PRN MG: 1 TABLET ORAL at 14:14

## 2018-06-23 RX ADMIN — BACITRACIN, NEOMYCIN, POLYMYXIN B SCH APPLIC: 400; 3.5; 5 OINTMENT TOPICAL at 08:35

## 2018-06-23 RX ADMIN — QUETIAPINE SCH MG: 25 TABLET, FILM COATED ORAL at 12:22

## 2018-06-23 RX ADMIN — DIVALPROEX SODIUM SCH MG: 125 CAPSULE ORAL at 12:22

## 2018-06-23 RX ADMIN — DIVALPROEX SODIUM SCH MG: 125 CAPSULE ORAL at 17:49

## 2018-06-23 RX ADMIN — ANORECTAL OINTMENT SCH APPLIC: 15.7; .44; 24; 20.6 OINTMENT TOPICAL at 09:00

## 2018-06-23 RX ADMIN — DOXAZOSIN MESYLATE SCH MG: 2 TABLET ORAL at 20:06

## 2018-06-23 RX ADMIN — Medication SCH TAB: at 08:32

## 2018-06-23 RX ADMIN — ANORECTAL OINTMENT SCH APPLIC: 15.7; .44; 24; 20.6 OINTMENT TOPICAL at 20:31

## 2018-06-23 RX ADMIN — DIVALPROEX SODIUM SCH MG: 125 CAPSULE ORAL at 08:33

## 2018-06-23 RX ADMIN — QUETIAPINE SCH MG: 25 TABLET, FILM COATED ORAL at 08:31

## 2018-06-23 RX ADMIN — METFORMIN HYDROCHLORIDE SCH MG: 500 TABLET ORAL at 17:48

## 2018-06-23 RX ADMIN — ATORVASTATIN CALCIUM SCH MG: 40 TABLET, FILM COATED ORAL at 20:06

## 2018-06-23 RX ADMIN — ASPIRIN SCH MG: 81 TABLET, CHEWABLE ORAL at 08:32

## 2018-06-23 RX ADMIN — FINASTERIDE SCH MG: 5 TABLET, FILM COATED ORAL at 08:33

## 2018-06-23 RX ADMIN — QUETIAPINE SCH MG: 25 TABLET, FILM COATED ORAL at 20:06

## 2018-06-23 RX ADMIN — METFORMIN HYDROCHLORIDE SCH MG: 500 TABLET ORAL at 08:30

## 2018-06-23 RX ADMIN — ANORECTAL OINTMENT PRN APPLIC: 15.7; .44; 24; 20.6 OINTMENT TOPICAL at 08:36

## 2018-06-23 RX ADMIN — QUETIAPINE SCH MG: 25 TABLET, FILM COATED ORAL at 17:48

## 2018-06-23 RX ADMIN — DIVALPROEX SODIUM SCH MG: 125 CAPSULE ORAL at 20:06

## 2018-06-23 NOTE — NUR
Pt was noted to be active during the day and walking with his walker assisted and guided by 
sitter. Family was seen visiting. Wife expressed concerns about bed hold for [facility]. Charge nurse informed and will be endorsed to  on Monday. Wife was 
concern about leg abrasion explained the situation and understands, information was obtained 
by charge nurse.

## 2018-06-23 NOTE — NUR
GPS:   Upon further assessment at this time,pt.with excoriation on sacral area. No s/s of 
infection noted. Observed pt.to be scratching area vigorously. Denied feeling itchy when 
asked. Pt.is confused,disoriented . Assisted in repositioning self prn. Wound consult orders 
received. Endorsed.

## 2018-06-23 NOTE — NUR
GPS:  Assisted pt.to the toilet at this time for elimination purposes. Noted pt.with a skin 
breakdown around sacral/coccyx area. Pt.is starting to get agitated at this time and rushing 
to get back to bed and go back to sleep. Staff unable to fully assess skin at this time. 
Pt.uncooperative at this time. Refused to have skin assessed.Confused,disoriented with poor 
insight. Will further assess skin in the morning. Pt.kept on his sides at this time.

## 2018-06-23 NOTE — NUR
GPS:   Pt.now awake. Psych meds.administered at  this time. Encouraged to reposition self 
prn. No c/o pain verbalized. Diaper dry at this time.

## 2018-06-24 VITALS — SYSTOLIC BLOOD PRESSURE: 139 MMHG | DIASTOLIC BLOOD PRESSURE: 90 MMHG

## 2018-06-24 VITALS — DIASTOLIC BLOOD PRESSURE: 70 MMHG | SYSTOLIC BLOOD PRESSURE: 134 MMHG

## 2018-06-24 VITALS — SYSTOLIC BLOOD PRESSURE: 142 MMHG | DIASTOLIC BLOOD PRESSURE: 70 MMHG

## 2018-06-24 RX ADMIN — ASPIRIN SCH MG: 81 TABLET, CHEWABLE ORAL at 09:54

## 2018-06-24 RX ADMIN — QUETIAPINE SCH MG: 25 TABLET, FILM COATED ORAL at 20:09

## 2018-06-24 RX ADMIN — DIVALPROEX SODIUM SCH MG: 125 CAPSULE ORAL at 13:35

## 2018-06-24 RX ADMIN — ACETAMINOPHEN PRN MG: 325 TABLET ORAL at 18:51

## 2018-06-24 RX ADMIN — ANORECTAL OINTMENT PRN APPLIC: 15.7; .44; 24; 20.6 OINTMENT TOPICAL at 10:03

## 2018-06-24 RX ADMIN — DIVALPROEX SODIUM SCH MG: 125 CAPSULE ORAL at 20:10

## 2018-06-24 RX ADMIN — DOXAZOSIN MESYLATE SCH MG: 2 TABLET ORAL at 20:11

## 2018-06-24 RX ADMIN — ANORECTAL OINTMENT SCH APPLIC: 15.7; .44; 24; 20.6 OINTMENT TOPICAL at 09:00

## 2018-06-24 RX ADMIN — BACITRACIN, NEOMYCIN, POLYMYXIN B SCH APPLIC: 400; 3.5; 5 OINTMENT TOPICAL at 09:00

## 2018-06-24 RX ADMIN — METFORMIN HYDROCHLORIDE SCH MG: 500 TABLET ORAL at 17:49

## 2018-06-24 RX ADMIN — DIVALPROEX SODIUM SCH MG: 125 CAPSULE ORAL at 09:53

## 2018-06-24 RX ADMIN — ANORECTAL OINTMENT SCH APPLIC: 15.7; .44; 24; 20.6 OINTMENT TOPICAL at 20:11

## 2018-06-24 RX ADMIN — QUETIAPINE SCH MG: 25 TABLET, FILM COATED ORAL at 09:54

## 2018-06-24 RX ADMIN — FINASTERIDE SCH MG: 5 TABLET, FILM COATED ORAL at 09:54

## 2018-06-24 RX ADMIN — ATORVASTATIN CALCIUM SCH MG: 40 TABLET, FILM COATED ORAL at 20:11

## 2018-06-24 RX ADMIN — QUETIAPINE SCH MG: 25 TABLET, FILM COATED ORAL at 17:49

## 2018-06-24 RX ADMIN — QUETIAPINE SCH MG: 25 TABLET, FILM COATED ORAL at 13:35

## 2018-06-24 RX ADMIN — METFORMIN HYDROCHLORIDE SCH MG: 500 TABLET ORAL at 09:53

## 2018-06-24 RX ADMIN — DIVALPROEX SODIUM SCH MG: 125 CAPSULE ORAL at 17:49

## 2018-06-24 RX ADMIN — Medication SCH TAB: at 09:54

## 2018-06-24 NOTE — NUR
patient has not c/o pain or discomfort since sliding  too floor this am , up in day room and 
in room with family  at visiting hour , patient still confused and disoriented

 1;1 sitter at bedside

## 2018-06-24 NOTE — NUR
patient found down on  floor  o.t noted event , no bruising or cuts noted from  event vital 
sign b/p 141/67 hr 95 . patient returned to jaun chair with assist 1.1 sitter continued as 
ordered continued to provide safety and 1;1

## 2018-06-25 VITALS — SYSTOLIC BLOOD PRESSURE: 133 MMHG | DIASTOLIC BLOOD PRESSURE: 60 MMHG

## 2018-06-25 VITALS — DIASTOLIC BLOOD PRESSURE: 83 MMHG | SYSTOLIC BLOOD PRESSURE: 136 MMHG

## 2018-06-25 VITALS — DIASTOLIC BLOOD PRESSURE: 79 MMHG | SYSTOLIC BLOOD PRESSURE: 153 MMHG

## 2018-06-25 RX ADMIN — LORAZEPAM PRN MG: 1 TABLET ORAL at 20:00

## 2018-06-25 RX ADMIN — ANORECTAL OINTMENT SCH APPLIC: 15.7; .44; 24; 20.6 OINTMENT TOPICAL at 08:50

## 2018-06-25 RX ADMIN — ASPIRIN SCH MG: 81 TABLET, CHEWABLE ORAL at 08:49

## 2018-06-25 RX ADMIN — ACETAMINOPHEN PRN MG: 325 TABLET ORAL at 20:01

## 2018-06-25 RX ADMIN — DIVALPROEX SODIUM SCH MG: 125 CAPSULE ORAL at 20:10

## 2018-06-25 RX ADMIN — QUETIAPINE SCH MG: 25 TABLET, FILM COATED ORAL at 17:16

## 2018-06-25 RX ADMIN — BACITRACIN, NEOMYCIN, POLYMYXIN B SCH APPLIC: 400; 3.5; 5 OINTMENT TOPICAL at 09:01

## 2018-06-25 RX ADMIN — QUETIAPINE SCH MG: 25 TABLET, FILM COATED ORAL at 20:11

## 2018-06-25 RX ADMIN — DOXAZOSIN MESYLATE SCH MG: 2 TABLET ORAL at 20:11

## 2018-06-25 RX ADMIN — QUETIAPINE SCH MG: 25 TABLET, FILM COATED ORAL at 13:22

## 2018-06-25 RX ADMIN — DIVALPROEX SODIUM SCH MG: 125 CAPSULE ORAL at 08:50

## 2018-06-25 RX ADMIN — METFORMIN HYDROCHLORIDE SCH MG: 500 TABLET ORAL at 08:49

## 2018-06-25 RX ADMIN — DIVALPROEX SODIUM SCH MG: 125 CAPSULE ORAL at 17:16

## 2018-06-25 RX ADMIN — Medication SCH TAB: at 08:49

## 2018-06-25 RX ADMIN — ACETAMINOPHEN PRN MG: 325 TABLET ORAL at 09:58

## 2018-06-25 RX ADMIN — ATORVASTATIN CALCIUM SCH MG: 40 TABLET, FILM COATED ORAL at 20:10

## 2018-06-25 RX ADMIN — METFORMIN HYDROCHLORIDE SCH MG: 500 TABLET ORAL at 17:16

## 2018-06-25 RX ADMIN — ANORECTAL OINTMENT SCH APPLIC: 15.7; .44; 24; 20.6 OINTMENT TOPICAL at 20:12

## 2018-06-25 RX ADMIN — DIVALPROEX SODIUM SCH MG: 125 CAPSULE ORAL at 13:22

## 2018-06-25 RX ADMIN — FINASTERIDE SCH MG: 5 TABLET, FILM COATED ORAL at 08:50

## 2018-06-25 RX ADMIN — QUETIAPINE SCH MG: 25 TABLET, FILM COATED ORAL at 08:50

## 2018-06-25 NOTE — NUR
GPS: REMAIN CALM AND COOPERATIVE WITH MEDICATION AND CARE. SHOWERED THIS MORNING. ASSISTED 
WITH ADL'S DUE TO PATIENT IS INCONTINENT. TURN Q 2 HRS FOR SKIN SAFETY. REMAIN ON 1:1 SITTER 
@ BEDSIDE FOR SAFETY. SLEPT 6:45 HRS THROUGH THE NIGHT. CONTINUE PLAN OF CARE.

## 2018-06-26 VITALS — DIASTOLIC BLOOD PRESSURE: 92 MMHG | SYSTOLIC BLOOD PRESSURE: 167 MMHG

## 2018-06-26 VITALS — DIASTOLIC BLOOD PRESSURE: 105 MMHG | SYSTOLIC BLOOD PRESSURE: 144 MMHG

## 2018-06-26 VITALS — DIASTOLIC BLOOD PRESSURE: 69 MMHG | SYSTOLIC BLOOD PRESSURE: 127 MMHG

## 2018-06-26 RX ADMIN — METFORMIN HYDROCHLORIDE SCH MG: 500 TABLET ORAL at 08:48

## 2018-06-26 RX ADMIN — FINASTERIDE SCH MG: 5 TABLET, FILM COATED ORAL at 08:49

## 2018-06-26 RX ADMIN — MUPIROCIN SCH GM: 20 OINTMENT TOPICAL at 21:00

## 2018-06-26 RX ADMIN — QUETIAPINE SCH MG: 25 TABLET, FILM COATED ORAL at 14:25

## 2018-06-26 RX ADMIN — ANORECTAL OINTMENT SCH APPLIC: 15.7; .44; 24; 20.6 OINTMENT TOPICAL at 08:49

## 2018-06-26 RX ADMIN — ANORECTAL OINTMENT PRN APPLIC: 15.7; .44; 24; 20.6 OINTMENT TOPICAL at 01:44

## 2018-06-26 RX ADMIN — METFORMIN HYDROCHLORIDE SCH MG: 500 TABLET ORAL at 17:27

## 2018-06-26 RX ADMIN — QUETIAPINE SCH MG: 25 TABLET, FILM COATED ORAL at 21:37

## 2018-06-26 RX ADMIN — DIVALPROEX SODIUM SCH MG: 125 CAPSULE ORAL at 17:27

## 2018-06-26 RX ADMIN — ANORECTAL OINTMENT SCH APPLIC: 15.7; .44; 24; 20.6 OINTMENT TOPICAL at 22:04

## 2018-06-26 RX ADMIN — ASPIRIN SCH MG: 81 TABLET, CHEWABLE ORAL at 08:48

## 2018-06-26 RX ADMIN — LORAZEPAM PRN MG: 1 TABLET ORAL at 07:56

## 2018-06-26 RX ADMIN — DIVALPROEX SODIUM SCH MG: 125 CAPSULE ORAL at 21:38

## 2018-06-26 RX ADMIN — DIVALPROEX SODIUM SCH MG: 125 CAPSULE ORAL at 08:49

## 2018-06-26 RX ADMIN — TEMAZEPAM PRN MG: 7.5 CAPSULE ORAL at 01:37

## 2018-06-26 RX ADMIN — ATORVASTATIN CALCIUM SCH MG: 40 TABLET, FILM COATED ORAL at 21:37

## 2018-06-26 RX ADMIN — DOXAZOSIN MESYLATE SCH MG: 2 TABLET ORAL at 21:37

## 2018-06-26 RX ADMIN — DIVALPROEX SODIUM SCH MG: 125 CAPSULE ORAL at 14:25

## 2018-06-26 RX ADMIN — Medication SCH TAB: at 08:48

## 2018-06-26 RX ADMIN — BACITRACIN, NEOMYCIN, POLYMYXIN B SCH APPLIC: 400; 3.5; 5 OINTMENT TOPICAL at 08:50

## 2018-06-26 RX ADMIN — QUETIAPINE SCH MG: 25 TABLET, FILM COATED ORAL at 17:27

## 2018-06-26 RX ADMIN — QUETIAPINE SCH MG: 25 TABLET, FILM COATED ORAL at 08:49

## 2018-06-26 NOTE — NUR
received to care, lying in bed, agitated and combative, at times. compliant with oral 
medications, but refused wound care. attempted to kick staff when attempted. as of 2200, he 
remains awake intermittently. 1:1 sitter at side for safety. will continue to monitor 
closely.

## 2018-06-26 NOTE — NUR
GPS:  Pt. starting to get agitated and combative during care now. Re-directed by staff. 
Explained importance of having his diaper changed. Pt.is confused,disoriented with poor 
insight to present situation. 1:1 sitter at bedside for safety. Will monitor for further 
escalation of behavior. Endorsed to incoming nurse.

## 2018-06-26 NOTE — NUR
WOUND CARE CONSULT: PT PRESENTS WITH INCONTINENCE ASSOCIATED SKIN DAMAGE TO GLUTEAL CREASE 
AREA AND RT LATERAL LOWER LEG WOUND. RECOMMENDATIONS MADE FOR WOUND CARE AND SKIN 
PROTECTION. RECOMMEND SURGICAL CONSULT FOR LEG WOUND. DISCUSSED ALL RECOMMENDATIONS WITH 
NURSING STAFF. WILL SEE PRN. SITTER AT BEDSIDE.  PT INCONTINENT, UNCOOPERATIVE, AGITATED AND 
COMBATIVE AT TIMES. MD IN AGREEMENT WITH PLAN OF CARE. 

-------------------------------------------------------------------------------

Addendum: 06/26/18 at 1235 by ALYSON ROGER RN

-------------------------------------------------------------------------------

Amended: Links added.

-------------------------------------------------------------------------------

Addendum: 06/26/18 at 1245 by ALYSON ROGER RN

-------------------------------------------------------------------------------

RT DORSALIS PEDIS AND POSTERIOR TIBIAL PULSES PALPABLE. EDEMA NOTED. LEG ELEVATED ON PILLOW.

## 2018-06-26 NOTE — NUR
GPS/RN- Sitter 

Patient continues to anxious, restless confused and disorganized unsteady gait. assisted 
with adls, attempts to ambulate without regard for own safety. fall risk, recent fall. 
continue sitter 1:1

## 2018-06-27 VITALS — DIASTOLIC BLOOD PRESSURE: 72 MMHG | SYSTOLIC BLOOD PRESSURE: 112 MMHG

## 2018-06-27 VITALS — DIASTOLIC BLOOD PRESSURE: 89 MMHG | SYSTOLIC BLOOD PRESSURE: 154 MMHG

## 2018-06-27 VITALS — SYSTOLIC BLOOD PRESSURE: 123 MMHG | DIASTOLIC BLOOD PRESSURE: 79 MMHG

## 2018-06-27 LAB
BASOPHILS # BLD AUTO: 0 K/UL (ref 0–8)
BASOPHILS NFR BLD AUTO: 0.2 % (ref 0–2)
EOSINOPHIL # BLD AUTO: 0 K/UL (ref 0–0.7)
EOSINOPHIL NFR BLD AUTO: 0.4 % (ref 0–7)
HCT VFR BLD AUTO: 32.1 % (ref 36.7–47.1)
HGB BLD-MCNC: 11 G/DL (ref 12.5–16.3)
LYMPHOCYTES # BLD AUTO: 0.7 K/UL (ref 20–40)
LYMPHOCYTES NFR BLD AUTO: 11.2 % (ref 20.5–51.5)
MCH RBC QN AUTO: 31 UUG (ref 23.8–33.4)
MCHC RBC AUTO-ENTMCNC: 34 G/DL (ref 32.5–36.3)
MCV RBC AUTO: 90 FL (ref 73–96.2)
MONOCYTES # BLD AUTO: 0.6 K/UL (ref 2–10)
MONOCYTES NFR BLD AUTO: 9.7 % (ref 0–11)
NEUTROPHILS # BLD AUTO: 4.6 K/UL (ref 1.8–8.9)
NEUTROPHILS NFR BLD AUTO: 78.5 % (ref 38.5–71.5)
PLATELET # BLD AUTO: 130 K/UL (ref 152–348)
RBC # BLD AUTO: 3.57 MIL/UL (ref 4.06–5.63)
WBC # BLD AUTO: 5.8 K/UL (ref 3.6–10.2)

## 2018-06-27 PROCEDURE — 0JBQ0ZZ EXCISION OF RIGHT FOOT SUBCUTANEOUS TISSUE AND FASCIA, OPEN APPROACH: ICD-10-PCS

## 2018-06-27 RX ADMIN — DIVALPROEX SODIUM SCH MG: 125 CAPSULE ORAL at 17:00

## 2018-06-27 RX ADMIN — TEMAZEPAM PRN MG: 7.5 CAPSULE ORAL at 00:31

## 2018-06-27 RX ADMIN — QUETIAPINE SCH MG: 25 TABLET, FILM COATED ORAL at 09:00

## 2018-06-27 RX ADMIN — DIVALPROEX SODIUM SCH MG: 125 CAPSULE ORAL at 13:00

## 2018-06-27 RX ADMIN — ATORVASTATIN CALCIUM SCH MG: 40 TABLET, FILM COATED ORAL at 21:00

## 2018-06-27 RX ADMIN — QUETIAPINE SCH MG: 25 TABLET, FILM COATED ORAL at 21:00

## 2018-06-27 RX ADMIN — DIVALPROEX SODIUM SCH MG: 125 CAPSULE ORAL at 09:00

## 2018-06-27 RX ADMIN — ASPIRIN SCH MG: 81 TABLET, CHEWABLE ORAL at 09:00

## 2018-06-27 RX ADMIN — DIVALPROEX SODIUM SCH MG: 125 CAPSULE ORAL at 21:00

## 2018-06-27 RX ADMIN — METFORMIN HYDROCHLORIDE SCH MG: 500 TABLET ORAL at 17:58

## 2018-06-27 RX ADMIN — MUPIROCIN SCH GM: 20 OINTMENT TOPICAL at 21:39

## 2018-06-27 RX ADMIN — QUETIAPINE SCH MG: 25 TABLET, FILM COATED ORAL at 17:00

## 2018-06-27 RX ADMIN — HYDROCODONE BITARTRATE AND ACETAMINOPHEN ONE TAB: 5; 325 TABLET ORAL at 18:48

## 2018-06-27 RX ADMIN — HYDROCODONE BITARTRATE AND ACETAMINOPHEN ONE TAB: 5; 325 TABLET ORAL at 18:52

## 2018-06-27 RX ADMIN — ANORECTAL OINTMENT SCH APPLIC: 15.7; .44; 24; 20.6 OINTMENT TOPICAL at 12:00

## 2018-06-27 RX ADMIN — ANORECTAL OINTMENT SCH APPLIC: 15.7; .44; 24; 20.6 OINTMENT TOPICAL at 21:40

## 2018-06-27 RX ADMIN — MUPIROCIN SCH GM: 20 OINTMENT TOPICAL at 12:00

## 2018-06-27 RX ADMIN — ACETAMINOPHEN PRN MG: 325 TABLET ORAL at 15:12

## 2018-06-27 RX ADMIN — FINASTERIDE SCH MG: 5 TABLET, FILM COATED ORAL at 09:00

## 2018-06-27 RX ADMIN — QUETIAPINE SCH MG: 25 TABLET, FILM COATED ORAL at 13:00

## 2018-06-27 RX ADMIN — Medication SCH TAB: at 09:00

## 2018-06-27 RX ADMIN — METFORMIN HYDROCHLORIDE SCH MG: 500 TABLET ORAL at 08:00

## 2018-06-27 RX ADMIN — DOXAZOSIN MESYLATE SCH MG: 2 TABLET ORAL at 21:00

## 2018-06-27 NOTE — NUR
PATIENT NOTED COUGHING, V/S STABLE AT THIS TIME. PATIENT IN NO DISTRESS. CHEST X RAY WAS 
ORDERED. WILL CONTINUE TO MONITOR.

## 2018-06-27 NOTE — NUR
Dr. Peace was in the unit to see patient and do the debridement of right lower leg. 
Patient able to tolerate the procedure well. Medicated for pain. Family at bedside. New 
orders for Po Augmentin noted and carried out. Wound dressing in place. Will continue to 
monitor

## 2018-06-27 NOTE — NUR
RECEIVED PATIENT IN HIS ROOM IN BED, HE IS NOTED AWAKE A/O X 1. HE CONTINUE ON 1:1 
SUPERVISION FOR FALL PRECAUTION. V/S STABLE AT THIS TIME. B/P 112/72, PULSE 96BPM, 
RESPIRATION 18BREATHS/MIN ORAL TEMP 97.4 AND O2SAT 98%. NO AGGRESSIVE COMBATIVE BX NOTED AT 
THIS TIME. SAFETY WAS EMPHASIS. WILL CONTINUE TO MONITOR.

## 2018-06-27 NOTE — NUR
PATIENT REFUSED ALL HER QHS MEDICATIONS. MULTIPLE REDIRECTION GIVEN, HOWEVER INEFFECTIVE. 
WOUND RT LOWER LEG WAS CLEANED AND REDRESSED. WILL CONTINUE TO MONITOR.

## 2018-06-28 NOTE — NUR
AT APPROX 0045 PATIENT WAS NOTED BREATHING FAST, APPROX 20 BREATHS PER MINUETS. CHEST X RAY 
WAS ORDERED STAT. V/S 119/56MMHG, PULSE 79BPM, ORAL TEMP 97.6F AND O2SAT 82%. PATIENT IS 
DNR. NURSE SUPERVISOR WAS THEN NOTIFY, SHE IMMEDIATELY CAME TO THE UNIT. OXYGEN AT 10L VIA 
FACE MASK (VENTURI MASK) WAS GIVEN. V/S AT APPROX 0055 WERE RETAKEN B/P 105/56MMHG, PULSE 
72BPM, O2 SAT 38% RESPIRATION 24BREATHS/MIN. THEN AT APPROX 0057 PATIENT PASSED AWAY. TIME 
OF DEATH WAS CALLED AT 0057. 

-------------------------------------------------------------------------------

Addendum: 06/28/18 at 0241 by CAMACHO KAPOOR RN

-------------------------------------------------------------------------------

PLEASE DISREGARD, INCORRECT INFORMATION.

## 2018-06-28 NOTE — NUR
AT APPROXIMATELY 0045, NURSING STAFF NOTED LABORED AND RAPID BREATHING. VITAL SIGNS WERE 
IMMEDIATELY TAKEN: BP /56, 79 HR, ORAL TEMP OF 97.6 F, 02 SAT OF 82% AND RAPIDLY 
DECLINING. NURSE SUPERVISOR WAS THEN IMMEDIATELY NOTIFIED. WHILE ON THE PHONE WITH NURSE 
SUPERVISOR, Pt WAS NOTED NOT BREATHING AND WITHOUT A PULSE. CPR AND OTHER LIFE SAVING 
MEASURES WERE NOT STARTED SINCE Pt IS ON A DO NOT RESUSCITATE CODE STATUS. NURSE SUPERVISOR 
IMMEDIATELY CAME TO THE UNIT TO ASSESS THE SITUATION. Pt WAS UNRESPONSIVE, NOT BREATHING, 
AND WITHOUT A PULSE. UNABLE TO RECORD BLOOD PRESSURE. ATTEMPTED TO ADMINISTER 10L OF OXYGEN 
VIA NON-REBREATHER MASK, NO IMPROVEMENT IN CONDITION. Pt STILL DE-SATURATING, NOT BREATHING, 
AND PULSELESS. TIME OF DEATH WAS CALLED AT 0057. ON-CALL INTERNIST, DR. JON, WAS NOTIFIED OF 
SITUATION. NURSE WILL ALSO CALL AND NOTIFY THE PSYCHIATRIST, DR. MYERS. FAMILY WAS 
NOTIFIED OF DEATH AND GIVEN INFORMATION REGARDING HOSPITAL PROTOCOL. FAMILY REQUESTED TO 
VISIT AND SEE THE BODY OF Pt. POST-MORTEM CARE WAS DONE BY NURSING STAFF, TRANSFERRED TO AN 
EMPTY ROOM FOR FAMILY PRIVACY DURING VISIT. FAMILY IS CURRENTLY IN THE ROOM, NURSE WILL ASK 
FOR THEIR WISHES AT A MORE APPROPRIATE TIME.

## 2018-06-28 NOTE — NUR
NURSING:

AT APPROXIMATELY 0045, CHANGE OF CONDITION WAS REPORTED FOR ONE OF THE PATIENTS IN THE UNIT. 
 IMMEDIATELY CAME TO THE UNIT TO ASSESS THE PATIENT.  UPON ARRIVAL, PATIENT WAS NOT 
AROUSABLE, PULSELESS, AND WAS NOT ABLE TO OBTAIN VITAL SIGNS.  PATIENT APNEIC FOR 5 MINUTES, 
PUPILS FIXED AND DILATED, NO AUDIBLE HEART TONES, BREATH SOUNDS FOR 1 MINUTE, NO PALPABLE 
PULSES FOR 1 MINUTE AND NO CORNEAL REFLEXES.  PATIENT PRONOUNCED AT 0057. PHYSICIAN 
NOTIFIED.
